# Patient Record
Sex: FEMALE | Employment: OTHER | URBAN - METROPOLITAN AREA
[De-identification: names, ages, dates, MRNs, and addresses within clinical notes are randomized per-mention and may not be internally consistent; named-entity substitution may affect disease eponyms.]

---

## 2020-07-17 ENCOUNTER — HOSPITAL ENCOUNTER (INPATIENT)
Age: 85
LOS: 4 days | Discharge: HOME | DRG: 813 | End: 2020-07-21
Attending: EMERGENCY MEDICINE | Admitting: INTERNAL MEDICINE
Payer: MEDICARE

## 2020-07-17 ENCOUNTER — APPOINTMENT (OUTPATIENT)
Dept: CT IMAGING | Age: 85
DRG: 813 | End: 2020-07-17
Attending: NURSE PRACTITIONER
Payer: MEDICARE

## 2020-07-17 DIAGNOSIS — K92.1 GASTROINTESTINAL HEMORRHAGE WITH MELENA: Primary | ICD-10-CM

## 2020-07-17 DIAGNOSIS — R00.0 TACHYCARDIA: ICD-10-CM

## 2020-07-17 DIAGNOSIS — R77.8 ELEVATED TROPONIN: ICD-10-CM

## 2020-07-17 DIAGNOSIS — K92.2 GASTROINTESTINAL HEMORRHAGE, UNSPECIFIED GASTROINTESTINAL HEMORRHAGE TYPE: ICD-10-CM

## 2020-07-17 DIAGNOSIS — D64.9 ANEMIA, UNSPECIFIED TYPE: ICD-10-CM

## 2020-07-17 PROBLEM — D62 ACUTE BLOOD LOSS ANEMIA: Status: ACTIVE | Noted: 2020-07-17

## 2020-07-17 PROBLEM — D68.9 COAGULOPATHY (HCC): Status: ACTIVE | Noted: 2020-07-17

## 2020-07-17 PROBLEM — K62.5 RECTAL BLEED: Status: ACTIVE | Noted: 2020-07-17

## 2020-07-17 LAB
ALBUMIN SERPL-MCNC: 3.3 G/DL (ref 3.4–5)
ALBUMIN/GLOB SERPL: 0.9 {RATIO} (ref 0.8–1.7)
ALP SERPL-CCNC: 52 U/L (ref 45–117)
ALT SERPL-CCNC: 14 U/L (ref 13–56)
ANION GAP SERPL CALC-SCNC: 5 MMOL/L (ref 3–18)
AST SERPL-CCNC: 14 U/L (ref 10–38)
BASOPHILS # BLD: 0 K/UL (ref 0–0.1)
BASOPHILS NFR BLD: 1 % (ref 0–2)
BILIRUB SERPL-MCNC: 0.3 MG/DL (ref 0.2–1)
BNP SERPL-MCNC: 1905 PG/ML (ref 0–1800)
BUN SERPL-MCNC: 14 MG/DL (ref 7–18)
BUN/CREAT SERPL: 12 (ref 12–20)
CALCIUM SERPL-MCNC: 8.3 MG/DL (ref 8.5–10.1)
CHLORIDE SERPL-SCNC: 113 MMOL/L (ref 100–111)
CK MB CFR SERPL CALC: 1.1 % (ref 0–4)
CK MB SERPL-MCNC: 1.7 NG/ML (ref 5–25)
CK SERPL-CCNC: 152 U/L (ref 26–192)
CO2 SERPL-SCNC: 24 MMOL/L (ref 21–32)
CREAT SERPL-MCNC: 1.16 MG/DL (ref 0.6–1.3)
DIFFERENTIAL METHOD BLD: ABNORMAL
EOSINOPHIL # BLD: 0 K/UL (ref 0–0.4)
EOSINOPHIL NFR BLD: 1 % (ref 0–5)
ERYTHROCYTE [DISTWIDTH] IN BLOOD BY AUTOMATED COUNT: 14.8 % (ref 11.6–14.5)
GLOBULIN SER CALC-MCNC: 3.7 G/DL (ref 2–4)
GLUCOSE SERPL-MCNC: 146 MG/DL (ref 74–99)
HCT VFR BLD AUTO: 20 % (ref 35–45)
HEMOCCULT STL QL: POSITIVE
HGB BLD-MCNC: 6.4 G/DL (ref 12–16)
INR PPP: 2.7 (ref 0.8–1.2)
LACTATE SERPL-SCNC: 1.8 MMOL/L (ref 0.4–2)
LIPASE SERPL-CCNC: 67 U/L (ref 73–393)
LYMPHOCYTES # BLD: 1.9 K/UL (ref 0.9–3.6)
LYMPHOCYTES NFR BLD: 31 % (ref 21–52)
MCH RBC QN AUTO: 28.6 PG (ref 24–34)
MCHC RBC AUTO-ENTMCNC: 32 G/DL (ref 31–37)
MCV RBC AUTO: 89.3 FL (ref 74–97)
MONOCYTES # BLD: 0.3 K/UL (ref 0.05–1.2)
MONOCYTES NFR BLD: 5 % (ref 3–10)
NEUTS SEG # BLD: 3.9 K/UL (ref 1.8–8)
NEUTS SEG NFR BLD: 62 % (ref 40–73)
PLATELET # BLD AUTO: 229 K/UL (ref 135–420)
PMV BLD AUTO: 11.3 FL (ref 9.2–11.8)
POTASSIUM SERPL-SCNC: 3.8 MMOL/L (ref 3.5–5.5)
PROT SERPL-MCNC: 7 G/DL (ref 6.4–8.2)
PROTHROMBIN TIME: 27.6 SEC (ref 11.5–15.2)
RBC # BLD AUTO: 2.24 M/UL (ref 4.2–5.3)
SODIUM SERPL-SCNC: 142 MMOL/L (ref 136–145)
TROPONIN I SERPL-MCNC: 0.08 NG/ML (ref 0–0.04)
WBC # BLD AUTO: 6.2 K/UL (ref 4.6–13.2)

## 2020-07-17 PROCEDURE — 85610 PROTHROMBIN TIME: CPT

## 2020-07-17 PROCEDURE — 82272 OCCULT BLD FECES 1-3 TESTS: CPT

## 2020-07-17 PROCEDURE — 85025 COMPLETE CBC W/AUTO DIFF WBC: CPT

## 2020-07-17 PROCEDURE — 65660000000 HC RM CCU STEPDOWN

## 2020-07-17 PROCEDURE — 70450 CT HEAD/BRAIN W/O DYE: CPT

## 2020-07-17 PROCEDURE — 80053 COMPREHEN METABOLIC PANEL: CPT

## 2020-07-17 PROCEDURE — 99285 EMERGENCY DEPT VISIT HI MDM: CPT

## 2020-07-17 PROCEDURE — 74011250636 HC RX REV CODE- 250/636: Performed by: NURSE PRACTITIONER

## 2020-07-17 PROCEDURE — 82550 ASSAY OF CK (CPK): CPT

## 2020-07-17 PROCEDURE — 74011000250 HC RX REV CODE- 250: Performed by: NURSE PRACTITIONER

## 2020-07-17 PROCEDURE — 77030021352 HC CBL LD SYS DISP COVD -B

## 2020-07-17 PROCEDURE — 83605 ASSAY OF LACTIC ACID: CPT

## 2020-07-17 PROCEDURE — 93005 ELECTROCARDIOGRAM TRACING: CPT

## 2020-07-17 PROCEDURE — 96374 THER/PROPH/DIAG INJ IV PUSH: CPT

## 2020-07-17 PROCEDURE — 36430 TRANSFUSION BLD/BLD COMPNT: CPT

## 2020-07-17 PROCEDURE — 86900 BLOOD TYPING SEROLOGIC ABO: CPT

## 2020-07-17 PROCEDURE — 86923 COMPATIBILITY TEST ELECTRIC: CPT

## 2020-07-17 PROCEDURE — P9016 RBC LEUKOCYTES REDUCED: HCPCS

## 2020-07-17 PROCEDURE — 30233N1 TRANSFUSION OF NONAUTOLOGOUS RED BLOOD CELLS INTO PERIPHERAL VEIN, PERCUTANEOUS APPROACH: ICD-10-PCS | Performed by: NURSE PRACTITIONER

## 2020-07-17 PROCEDURE — C9113 INJ PANTOPRAZOLE SODIUM, VIA: HCPCS | Performed by: NURSE PRACTITIONER

## 2020-07-17 PROCEDURE — 83880 ASSAY OF NATRIURETIC PEPTIDE: CPT

## 2020-07-17 PROCEDURE — 83690 ASSAY OF LIPASE: CPT

## 2020-07-17 RX ORDER — TRAMADOL HYDROCHLORIDE 50 MG/1
50 TABLET ORAL
Status: DISCONTINUED | OUTPATIENT
Start: 2020-07-17 | End: 2020-07-21 | Stop reason: HOSPADM

## 2020-07-17 RX ORDER — SODIUM CHLORIDE 0.9 % (FLUSH) 0.9 %
5-40 SYRINGE (ML) INJECTION AS NEEDED
Status: DISCONTINUED | OUTPATIENT
Start: 2020-07-17 | End: 2020-07-21 | Stop reason: HOSPADM

## 2020-07-17 RX ORDER — PHYTONADIONE 1 MG/.5ML
5 INJECTION, EMULSION INTRAMUSCULAR; INTRAVENOUS; SUBCUTANEOUS ONCE
Status: COMPLETED | OUTPATIENT
Start: 2020-07-18 | End: 2020-07-18

## 2020-07-17 RX ORDER — SODIUM CHLORIDE 9 MG/ML
250 INJECTION, SOLUTION INTRAVENOUS AS NEEDED
Status: DISCONTINUED | OUTPATIENT
Start: 2020-07-17 | End: 2020-07-21 | Stop reason: HOSPADM

## 2020-07-17 RX ORDER — METOPROLOL TARTRATE 25 MG/1
25 TABLET, FILM COATED ORAL EVERY 12 HOURS
Status: DISCONTINUED | OUTPATIENT
Start: 2020-07-18 | End: 2020-07-21 | Stop reason: HOSPADM

## 2020-07-17 RX ORDER — SODIUM CHLORIDE 0.9 % (FLUSH) 0.9 %
5-40 SYRINGE (ML) INJECTION EVERY 8 HOURS
Status: DISCONTINUED | OUTPATIENT
Start: 2020-07-18 | End: 2020-07-21 | Stop reason: HOSPADM

## 2020-07-17 RX ORDER — DEXTROSE, SODIUM CHLORIDE, AND POTASSIUM CHLORIDE 5; .45; .075 G/100ML; G/100ML; G/100ML
50 INJECTION INTRAVENOUS CONTINUOUS
Status: DISCONTINUED | OUTPATIENT
Start: 2020-07-18 | End: 2020-07-21 | Stop reason: HOSPADM

## 2020-07-17 RX ADMIN — SODIUM CHLORIDE 500 ML: 900 INJECTION, SOLUTION INTRAVENOUS at 16:20

## 2020-07-17 RX ADMIN — SODIUM CHLORIDE 80 MG: 9 INJECTION, SOLUTION INTRAMUSCULAR; INTRAVENOUS; SUBCUTANEOUS at 16:20

## 2020-07-17 NOTE — H&P
Internal Medicine History and Physical  Note           NAME:  Nick Nelson   :   1935   MRN:  [id number]     PCP:  Mehrdad Iraheta MD     Date/Time:  2020 5:50 PM      I hereby certify this patient for admission based upon medical necessity as noted below:        Assessment / plan :        #   GI bleed (2020). Presented as rectal bleeding. PPI IV . GI consulted. IVF. Serial H/H. Monitor lytes. Maintain good IV access. Maintain vital signs. Avoid NSAID.   1 unit of blood transfusion ordered by ER staff    #Coagulopathy. INR elevated. She is on Xarelto. No need for active reversal with plasma agents as no active bleeding. Subcutaneous vitamin K and observe. Monitor INR. #   HTN (hypertension) (2015) control blood pressure. Placed parameter to avoid hypotension especially with GI bleed     #History of chronic diastolic CHF (congestive heart failure) and coagulopathy ([address] (2020). Home regimen when appropriate. She does not follow with cardiologist.     #  Acute blood loss anemia (2020) blood transfusion according to guideline    #Does not have medical follow-up and noncompliance with medication. She report last time seen PCP about 3 years ago. She take her medications infrequently and that is why it last long that time. Luiz Rivera -DVT prophylaxis :  SCD.   - Code Status : full     -Other chronic medical problems as per past history. Further management depend on the course of the case and expanded data base. DISPO - pt to be admitted at this time for reasons addressed above, continued hospitalization for ongoing assessment and treatment indicated        Subjective:     CHIEF COMPLAINT: Passing of blood in the bathroom    HISTORY OF PRESENT ILLNESS:     Ms. Vaibhav Orellana is a 80 y.o.  female who is admitted for rectal bleeding and coagulopathy.   Ms. Vaibhav Orellana with past medical history as noted below , presented to the Emergency Department today  complaining of above. Triage and ER notes noted. Patient presented to the emergency room complaining of dizziness and blood in the stool for the last 3 days. She told me she has been losing blood when she goes to the bathroom since Sunday and she thought this can get better but it did not light up and as she did not feel well today she called her granddaughter who brought her to the emergency room. She denies nausea vomiting or abdominal pain. She did not eat anything today because she did not feel like it and also to avoid to go to the bathroom which goes right away or soon after she ate. She cannot remember when was the last time seen primary doctor but as of March when she was admitted to this hospital about 3 years ago and never seen a doctor since, maybe 1 or 2 times. No doctor calls medications for her but she does not take any medicine regularly and that is white/long. Patient was poor historian clearly and able to give meaningful or appropriate history apart from what mentioned. ER provider called GI doctor on call and ordered one blood transfusion for her. Her hemoglobin was 6.4 in the emergency room. She does not check her blood pressure. She is not aware of any thyroid problem or diabetes mellitus. Fecal occult blood tested positive in the ER. She does not smoke or drink alcohol or any other substances. She does not take aspirin or over-the-counter ibuprofen or similar agents. She Drive and lives by herself    Past Medical History:   Diagnosis Date    Atrial flutter ([facility] [address] 02/2015    Cardiomyopathy ([facility] [address]     EF 30% (2/15)    Hypertension     Mitral regurgitation         History reviewed. No pertinent surgical history. Social History     Tobacco Use    Smoking status: Never Smoker    Smokeless tobacco: Never Used   Substance Use Topics    Alcohol use: No        History reviewed. No pertinent family history.      No Known Allergies     Prior to Admission medications Medication Sig Start Date End Date Taking? Authorizing Provider   rivaroxaban (XARELTO) 20 mg tab tablet Take 1 Tab by mouth daily (with breakfast). 2/25/15   Megan Munoz MD   metoprolol (LOPRESSOR) 25 mg tablet Take 1 Tab by mouth every twelve (12) hours. 2/24/15   Megan Munoz MD   lisinopril (PRINIVIL, ZESTRIL) 5 mg tablet Take 1 Tab by mouth daily. 2/24/15   Megan Munoz MD   furosemide (LASIX) 40 mg tablet Take 1 Tab by mouth daily. 2/24/15   Megan Munoz MD   potassium chloride (KAON 10%) 20 mEq/15 mL solution Take 15 mL by mouth daily. 2/24/15   Megan Munoz MD   traMADol (ULTRAM) 50 mg tablet Take 1 Tab by mouth every six (6) hours as needed for Pain.  11/7/13   MAULIK Bianchi       Review of Systems:  (bold if positive, otherwise negative), apart from what mentioned in HPI  Apart from above patient deny any other significant complain  Gen:  Weight gain, weight loss, fever, chills, fatigue  Eyes:  Visual changes, pain, conjunctivitis  ENT:  Sore throat, rhinorrhea, decreased hearing  CVS:  Palpitations, chest pain, dizziness, syncope, edema, PND  Pulm:  Cough, dyspnea, sputum, hemoptysis, wheezing  GI:  Abdominal pain, nausea, emesis, diarrhea, constipation, GERD, melena  :  Hematuria, incontinence, nocturia, dysuria, discharge  MS:  Pain, weakness, swelling, arthritis  Skin:  Rash, erythema, abscess, wound, moles  Endo:  Heat intolerance, cold intolerance, weight gain, polyuria  Hem:  Enlarged nodes, bruising, bleeding, night sweats  Renal:  Edema, change in urine, flank pain  Neuro:  Numbness, tingling, weakness, seizure, headache, tremors       VITALS:    Vital signs reviewed; most recent are:    Visit Vitals  /75 (BP 1 Location: Right arm, BP Patient Position: At rest)   Pulse (!) 115   Temp 98 °F (36.7 °C)   Resp 16   Ht 5' 4\" (1.626 m)   Wt 68 kg (150 lb)   SpO2 100%   BMI 25.75 kg/m²     SpO2 Readings from Last 6 Encounters:   07/17/20 100%   06/18/15 98% 03/17/15 99%   02/24/15 96%   11/07/13 100%            Intake/Output Summary (Last 24 hours) at 7/17/2020 1821  Last data filed at 7/17/2020 1733  Gross per 24 hour   Intake 500 ml   Output    Net 500 ml        Physical Exam:     Gen:  Appear stated age, Well-developed, well-nourished, in no acute distress  HEENT: Pale conjunctiva head atraumatic, normocephalic , hearing intact to voice, moist mucous membranes. Neck:  Trachea midline , No apparent JVD, Supple, without masses, thyroid non-tender  Resp:  No accessory muscle use,Bilateral BS present, clear breath sounds without wheezes rales or rhonchi  Card:   normal S1, S2 without Gallop . No Significant lower leg peripheral edema. Abd:  Soft, non-tender, non-distended, bowel sounds are present. Musc:  No cyanosis or clubbing. Skin:  No rashes or ulcers, skin turgor is good. Neuro:  Cranial nerves are grossly intact, no clear area of focal motor weakness, follows commands appropriately. Psych:   oriented to person, place and time, alert. Labs: All Cardiac Markers in the last 24 hours:   Lab Results   Component Value Date/Time     07/17/2020 04:10 PM    CKMB 1.7 07/17/2020 04:10 PM    CKND1 1.1 07/17/2020 04:10 PM    TROIQ 0.08 (H) 07/17/2020 04:10 PM       Recent Labs     07/17/20  1610   WBC 6.2   HGB 6.4*   HCT 20.0*        Recent Labs     07/17/20  1610      K 3.8   *   CO2 24   *   BUN 14   CREA 1.16   CA 8.3*   ALB 3.3*   TBILI 0.3   ALT 14     No results found for: GLUCPOC  No results for input(s): PH, PCO2, PO2, HCO3, FIO2 in the last 72 hours. Recent Labs     07/17/20  1610   INR 2.7*       Ct Head Wo Cont    Result Date: 7/17/2020  IMPRESSION: 1. No CT evidence of an acute intracranial abnormality - in particular, no acute cortical infarct, hemorrhage, or mass effect.   2.  Relatively mild cerebral atrophy/volume loss and periventricular white matter changes, most commonly seen with chronic microvascular disease. Please refer to radiology reports. Risk of deterioration: high      Total time spent in the care of this patient: [address] discussed with: Patient, Nursing Staff, >50% of time spent in counseling and coordination of care and ER provider      Discussed:  Care Plan       I have personally reviewed all pertinent labs, films and EKGs that have officially resulted. I reviewed available pertaining electronic documentation outlining the initial presentation as well as the emergency room physician's encounter. This document in whole or part of it has been produced using voice recognition software. Unrecognized errors in transcription may be present.     Attending Physician: Michelle Birmingham MD

## 2020-07-17 NOTE — ED PROVIDER NOTES
EMERGENCY DEPARTMENT HISTORY AND PHYSICAL EXAM    5:39 PM      Date: 7/17/2020  Patient Name: Nick Nelson    History of Presenting Illness     Chief Complaint   Patient presents with    Dizziness    Blood in Urine    Anorexia         History Provided By: Patient    Additional History (Context): Nick Nelson is a 80 y.o. female with hx of atrial flutter, cardiomayopathy, HTN who presents with complain of dizziness and blood in her stool for the past 3 days. Pt reports she takes xarelto daily. Pt is not sure if her last dose was today or yesterday. Patient denies abdominal pain, nausea, vomiting. Pt denies any fevers, headaches, extremity weakness, or numbness. Pt denies visual loss. PCP: Mehrdad Iraheta MD    Current Facility-Administered Medications   Medication Dose Route Frequency Provider Last Rate Last Dose    0.9% sodium chloride infusion 250 mL  250 mL IntraVENous PRN Elaine Javier NP         Current Outpatient Medications   Medication Sig Dispense Refill    rivaroxaban (XARELTO) 20 mg tab tablet Take 1 Tab by mouth daily (with breakfast). 30 Tab 0    metoprolol (LOPRESSOR) 25 mg tablet Take 1 Tab by mouth every twelve (12) hours. 60 Tab 0    lisinopril (PRINIVIL, ZESTRIL) 5 mg tablet Take 1 Tab by mouth daily. 30 Tab 0    furosemide (LASIX) 40 mg tablet Take 1 Tab by mouth daily. 30 Tab 0    potassium chloride (KAON 10%) 20 mEq/15 mL solution Take 15 mL by mouth daily. 480 mL 0    traMADol (ULTRAM) 50 mg tablet Take 1 Tab by mouth every six (6) hours as needed for Pain. 20 Tab 0       Past History     Past Medical History:  Past Medical History:   Diagnosis Date    Atrial flutter ([facility] [address] 02/2015    Cardiomyopathy ([facility] [address]     EF 30% (2/15)    Hypertension     Mitral regurgitation        Past Surgical History:  History reviewed. No pertinent surgical history. Family History:  History reviewed. No pertinent family history.     Social History:  Social History     Tobacco Use    Smoking status: Never Smoker    Smokeless tobacco: Never Used   Substance Use Topics    Alcohol use: No    Drug use: No       Allergies:  No Known Allergies      Review of Systems       Review of Systems   Constitutional: Negative for chills and fever. Respiratory: Negative for shortness of breath. Cardiovascular: Negative for chest pain. Gastrointestinal: Negative for abdominal pain, nausea and vomiting. Skin: Negative for rash. Neurological: Negative for weakness. All other systems reviewed and are negative. Physical Exam     Visit Vitals  /58   Pulse 84   Temp 98 °F (36.7 °C)   Resp 20   Ht 5' 4\" (1.626 m)   Wt 68 kg (150 lb)   SpO2 100%   BMI 25.75 kg/m²         Physical Exam  Vitals signs reviewed. Constitutional:       General: She is not in acute distress. Appearance: Normal appearance. She is well-developed. She is not ill-appearing or toxic-appearing. HENT:      Head: Normocephalic and atraumatic. Eyes:      Conjunctiva/sclera: Conjunctivae normal.      Pupils: Pupils are equal, round, and reactive to light. Neck:      Musculoskeletal: Normal range of motion. Trachea: Trachea normal.   Cardiovascular:      Rate and Rhythm: Normal rate and regular rhythm. Pulmonary:      Effort: Pulmonary effort is normal.      Breath sounds: Normal breath sounds. Abdominal:      General: Bowel sounds are normal. There is no distension or abdominal bruit. Palpations: Abdomen is soft. Abdomen is not rigid. There is no shifting dullness, fluid wave, mass or pulsatile mass. Tenderness: There is no abdominal tenderness. There is no guarding. Negative signs include Quintero's sign and McBurney's sign. Comments: No abdominal tenderness. Genitourinary:     Rectum: Guaiac result positive. No tenderness or anal fissure. Normal anal tone. Musculoskeletal:      Right lower leg: No edema. Left lower leg: No edema.    Neurological:      General: No focal deficit present. Mental Status: She is alert and oriented to person, place, and time. Mental status is at baseline. Diagnostic Study Results     Labs -  Recent Results (from the past 12 hour(s))   EKG, 12 LEAD, INITIAL    Collection Time: 07/17/20  3:47 PM   Result Value Ref Range    Ventricular Rate 113 BPM    Atrial Rate 113 BPM    P-R Interval 176 ms    QRS Duration 138 ms    Q-T Interval 432 ms    QTC Calculation (Bezet) 592 ms    Calculated R Axis -34 degrees    Calculated T Axis 16 degrees    Diagnosis       Sinus tachycardia with occasional premature ventricular complexes  Left axis deviation  Right bundle branch block  Minimal voltage criteria for LVH, may be normal variant ( R in aVL )  Abnormal ECG  When compared with ECG of 19-FEB-2015 16:01,  Significant changes have occurred     CBC WITH AUTOMATED DIFF    Collection Time: 07/17/20  4:10 PM   Result Value Ref Range    WBC 6.2 4.6 - 13.2 K/uL    RBC 2.24 (L) 4.20 - 5.30 M/uL    HGB 6.4 (L) 12.0 - 16.0 g/dL    HCT 20.0 (L) 35.0 - 45.0 %    MCV 89.3 74.0 - 97.0 FL    MCH 28.6 24.0 - 34.0 PG    MCHC 32.0 31.0 - 37.0 g/dL    RDW 14.8 (H) 11.6 - 14.5 %    PLATELET 363 936 - 719 K/uL    MPV 11.3 9.2 - 11.8 FL    NEUTROPHILS 62 40 - 73 %    LYMPHOCYTES 31 21 - 52 %    MONOCYTES 5 3 - 10 %    EOSINOPHILS 1 0 - 5 %    BASOPHILS 1 0 - 2 %    ABS. NEUTROPHILS 3.9 1.8 - 8.0 K/UL    ABS. LYMPHOCYTES 1.9 0.9 - 3.6 K/UL    ABS. MONOCYTES 0.3 0.05 - 1.2 K/UL    ABS. EOSINOPHILS 0.0 0.0 - 0.4 K/UL    ABS.  BASOPHILS 0.0 0.0 - 0.1 K/UL    DF AUTOMATED     METABOLIC PANEL, COMPREHENSIVE    Collection Time: 07/17/20  4:10 PM   Result Value Ref Range    Sodium 142 136 - 145 mmol/L    Potassium 3.8 3.5 - 5.5 mmol/L    Chloride 113 (H) 100 - 111 mmol/L    CO2 24 21 - 32 mmol/L    Anion gap 5 3.0 - 18 mmol/L    Glucose 146 (H) 74 - 99 mg/dL    BUN 14 7.0 - 18 MG/DL    Creatinine 1.16 0.6 - 1.3 MG/DL    BUN/Creatinine ratio 12 12 - 20      GFR est AA 54 (L) >60 ml/min/1.73m2    GFR est non-AA 45 (L) >60 ml/min/1.73m2    Calcium 8.3 (L) 8.5 - 10.1 MG/DL    Bilirubin, total 0.3 0.2 - 1.0 MG/DL    ALT (SGPT) 14 13 - 56 U/L    AST (SGOT) 14 10 - 38 U/L    Alk. phosphatase 52 45 - 117 U/L    Protein, total 7.0 6.4 - 8.2 g/dL    Albumin 3.3 (L) 3.4 - 5.0 g/dL    Globulin 3.7 2.0 - 4.0 g/dL    A-G Ratio 0.9 0.8 - 1.7     LIPASE    Collection Time: 07/17/20  4:10 PM   Result Value Ref Range    Lipase 67 (L) 73 - 393 U/L   LACTIC ACID    Collection Time: 07/17/20  4:10 PM   Result Value Ref Range    Lactic acid 1.8 0.4 - 2.0 MMOL/L   OCCULT BLOOD, STOOL    Collection Time: 07/17/20  4:10 PM   Result Value Ref Range    Occult blood, stool Positive (A) NEG     PROTHROMBIN TIME + INR    Collection Time: 07/17/20  4:10 PM   Result Value Ref Range    Prothrombin time 27.6 (H) 11.5 - 15.2 sec    INR 2.7 (H) 0.8 - 1.2     TYPE & SCREEN    Collection Time: 07/17/20  4:10 PM   Result Value Ref Range    Crossmatch Expiration 07/20/2020     ABO/Rh(D) O POSITIVE     Antibody screen NEG     Unit number [id number]     Blood component type RC LR     Unit division 00     Status of unit ALLOCATED     Crossmatch result Compatible    CARDIAC PANEL,(CK, CKMB & TROPONIN)    Collection Time: 07/17/20  4:10 PM   Result Value Ref Range    CK - MB 1.7 <3.6 ng/ml    CK-MB Index 1.1 0.0 - 4.0 %     26 - 192 U/L    Troponin-I, QT 0.08 (H) 0.0 - 0.045 NG/ML   NT-PRO BNP    Collection Time: 07/17/20  4:10 PM   Result Value Ref Range    NT pro-BNP 1,905 (H) 0 - 1,800 PG/ML       Radiologic Studies -   CT HEAD WO CONT   Final Result   IMPRESSION:      1. No CT evidence of an acute intracranial abnormality - in particular, no   acute cortical infarct, hemorrhage, or mass effect. 2.  Relatively mild cerebral atrophy/volume loss and periventricular white   matter changes, most commonly seen with chronic microvascular disease.                    Medical Decision Making   I am the first provider for this patient. I reviewed the vital signs, available nursing notes, past medical history, past surgical history, family history and social history. Vital Signs-Reviewed the patient's vital signs. Records Reviewed: Nursing Notes and Old Medical Records (Time of Review: 5:39 PM)    ED Course: Progress Notes, Reevaluation, and Consults:    Provider Notes (Medical Decision Making):    80 y.o. female with hx of atrial flutter, cardiomayopathy, HTN who presents with complain of dizziness and blood in her stool for the past 3 days. Pt reports she takes xarelto daily. Pt is not sure if her last dose was today or yesterday. Patient denies abdominal pain, nausea, vomiting. Pt denies any fevers, headaches, extremity weakness, or numbness. Pt denies visual loss. Pt hemoglobin is 6.4. Hemoccult was positive. Bright red blood on stool sample. No active hemorrhage from rectum. I suspect patient may have GI bleed. Protonix given. Blood transfusion ordered. Ct of head showed no acute hemmorrhage. Troponin 0.08. consulted with GI, Dr. Janes Mina and will see patient tomorrow and to stop Xarelto. Spoke to hospitalist Dr. Rc Collazo and agreed to admit patient. He was made aware of GI consult. Cardiologist Dr. Sherice Gerber made aware of patient slightly elevated troponin and added to the treatment team. No further orders at this time. Pt is in no acute distress and hemodynamically stable at this time. Vital signs are normal. BP stable. HR has decreased. Pt denies any abdominal pain or any other complaints at this time. All ADT Orders (ADT stands for Admission Discharge Transfer) (From admission, onward)     Start     Ordered    07/17/20 1800  INITIAL PHYSICIAN ORDER: INPATIENT Telemetry; 9.  Other (further clarification in H&P documentation)  ONE TIME     Authorizing Provider:  William Hanna MD    User who entered the order:  William Hanna MD       Question Answer Comment   Status: INPATIENT    Type of Bed Telemetry Inpatient Hospitalization Certified Necessary for the Following Reasons 9. Other (further clarification in H&P documentation)    Admitting Diagnosis GI bleed    Admitting Physician Kurtis Patel    Attending Physician Kurtis Patel    Estimated Length of Stay 2 Midnights    Discharge Plan: Home with Office Follow-up        07/17/20 1801                Diagnosis     Clinical Impression:   1. Gastrointestinal hemorrhage with melena    2. Anemia, unspecified type    3. Tachycardia    4. Gastrointestinal hemorrhage, unspecified gastrointestinal hemorrhage type    5. Elevated troponin        Disposition: Admit    Follow-up Information    None          Patient's Medications   Start Taking    No medications on file   Continue Taking    FUROSEMIDE (LASIX) 40 MG TABLET    Take 1 Tab by mouth daily. LISINOPRIL (PRINIVIL, ZESTRIL) 5 MG TABLET    Take 1 Tab by mouth daily. METOPROLOL (LOPRESSOR) 25 MG TABLET    Take 1 Tab by mouth every twelve (12) hours. POTASSIUM CHLORIDE (KAON 10%) 20 MEQ/15 ML SOLUTION    Take 15 mL by mouth daily. RIVAROXABAN (XARELTO) 20 MG TAB TABLET    Take 1 Tab by mouth daily (with breakfast). TRAMADOL (ULTRAM) 50 MG TABLET    Take 1 Tab by mouth every six (6) hours as needed for Pain. These Medications have changed    No medications on file   Stop Taking    No medications on file       Dictation disclaimer:  Please note that this dictation was completed with [employer], the computer voice recognition software. Quite often unanticipated grammatical, syntax, homophones, and other interpretive errors are inadvertently transcribed by the computer software. Please disregard these errors. Please excuse any errors that have escaped final proofreading.

## 2020-07-18 ENCOUNTER — APPOINTMENT (OUTPATIENT)
Dept: NON INVASIVE DIAGNOSTICS | Age: 85
DRG: 813 | End: 2020-07-18
Attending: INTERNAL MEDICINE
Payer: MEDICARE

## 2020-07-18 LAB
ALBUMIN SERPL-MCNC: 2.8 G/DL (ref 3.4–5)
ALBUMIN/GLOB SERPL: 1.1 {RATIO} (ref 0.8–1.7)
ALP SERPL-CCNC: 42 U/L (ref 45–117)
ALT SERPL-CCNC: 10 U/L (ref 13–56)
ANION GAP SERPL CALC-SCNC: 3 MMOL/L (ref 3–18)
AST SERPL-CCNC: 16 U/L (ref 10–38)
ATRIAL RATE: 113 BPM
BASOPHILS # BLD: 0 K/UL (ref 0–0.1)
BASOPHILS NFR BLD: 1 % (ref 0–2)
BILIRUB DIRECT SERPL-MCNC: 0.1 MG/DL (ref 0–0.2)
BILIRUB SERPL-MCNC: 0.4 MG/DL (ref 0.2–1)
BUN SERPL-MCNC: 13 MG/DL (ref 7–18)
BUN/CREAT SERPL: 14 (ref 12–20)
CALCIUM SERPL-MCNC: 7.8 MG/DL (ref 8.5–10.1)
CALCULATED R AXIS, ECG10: -34 DEGREES
CALCULATED T AXIS, ECG11: 16 DEGREES
CHLORIDE SERPL-SCNC: 115 MMOL/L (ref 100–111)
CO2 SERPL-SCNC: 25 MMOL/L (ref 21–32)
CREAT SERPL-MCNC: 0.96 MG/DL (ref 0.6–1.3)
DIAGNOSIS, 93000: NORMAL
DIFFERENTIAL METHOD BLD: ABNORMAL
ECHO AO ASC DIAM: 3.12 CM
ECHO AO ROOT DIAM: 3.16 CM
ECHO IVC PROX: 2.19 CM
ECHO IVC SNIFF: 2.19 CM
ECHO LA AREA 2C: 29.05 CM2
ECHO LA AREA 4C: 29.3 CM2
ECHO LA MAJOR AXIS: 3.48 CM
ECHO LA MINOR AXIS: 1.91 CM
ECHO LA TO AORTIC ROOT RATIO: 1.1
ECHO LA VOL 2C: 105.53 ML (ref 22–52)
ECHO LA VOL 4C: 98.37 ML (ref 22–52)
ECHO LA VOL BP: 111.3 ML (ref 22–52)
ECHO LA VOL/BSA BIPLANE: 61.12 ML/M2 (ref 16–28)
ECHO LA VOLUME INDEX A2C: 57.95 ML/M2 (ref 16–28)
ECHO LA VOLUME INDEX A4C: 54.02 ML/M2 (ref 16–28)
ECHO LV E' SEPTAL VELOCITY: 3.55 CM/S
ECHO LV EDV A2C: 149 ML
ECHO LV EDV A4C: 162.9 ML
ECHO LV EDV BP: 160.3 ML (ref 56–104)
ECHO LV EDV INDEX A4C: 89.5 ML/M2
ECHO LV EDV INDEX BP: 88 ML/M2
ECHO LV EDV NDEX A2C: 81.8 ML/M2
ECHO LV EDV TEICHHOLZ: 0.93 ML
ECHO LV EJECTION FRACTION A2C: 55 %
ECHO LV EJECTION FRACTION A4C: 40 %
ECHO LV EJECTION FRACTION BIPLANE: 48 % (ref 55–100)
ECHO LV ESV A2C: 67.8 ML
ECHO LV ESV A4C: 97.4 ML
ECHO LV ESV BP: 83.4 ML (ref 19–49)
ECHO LV ESV INDEX A2C: 37.2 ML/M2
ECHO LV ESV INDEX A4C: 53.5 ML/M2
ECHO LV ESV INDEX BP: 45.8 ML/M2
ECHO LV ESV TEICHHOLZ: 0.52 ML
ECHO LV INTERNAL DIMENSION DIASTOLIC: 5.56 CM (ref 3.9–5.3)
ECHO LV INTERNAL DIMENSION SYSTOLIC: 4.33 CM
ECHO LV IVSD: 0.94 CM (ref 0.6–0.9)
ECHO LV MASS 2D: 178.6 G (ref 67–162)
ECHO LV MASS INDEX 2D: 98.1 G/M2 (ref 43–95)
ECHO LV POSTERIOR WALL DIASTOLIC: 0.78 CM (ref 0.6–0.9)
ECHO LVOT DIAM: 1.99 CM
ECHO LVOT PEAK GRADIENT: 4.1 MMHG
ECHO LVOT SV: 58.2 ML
ECHO LVOT VTI: 18.64 CM
ECHO MV A VELOCITY: 112.62 CM/S
ECHO MV E DECELERATION TIME (DT): 185.6 MS
ECHO MV E VELOCITY: 72.32 CM/S
ECHO MV E/A RATIO: 0.64
ECHO MV E/E' SEPTAL: 20.37
ECHO RA MINOR AXIS: 4.54 CM
ECHO RV TAPSE: 2.17 CM (ref 1.5–2)
ECHO TV REGURGITANT MAX VELOCITY: 303 CM/S
ECHO TV REGURGITANT PEAK GRADIENT: 36.8 MMHG
EOSINOPHIL # BLD: 0.1 K/UL (ref 0–0.4)
EOSINOPHIL NFR BLD: 2 % (ref 0–5)
ERYTHROCYTE [DISTWIDTH] IN BLOOD BY AUTOMATED COUNT: 15.2 % (ref 11.6–14.5)
GLOBULIN SER CALC-MCNC: 2.5 G/DL (ref 2–4)
GLUCOSE SERPL-MCNC: 110 MG/DL (ref 74–99)
HCT VFR BLD AUTO: 18.2 % (ref 35–45)
HCT VFR BLD AUTO: 21.9 % (ref 35–45)
HGB BLD-MCNC: 5.8 G/DL (ref 12–16)
HGB BLD-MCNC: 7 G/DL (ref 12–16)
INR PPP: 1.9 (ref 0.8–1.2)
LVFS 2D: 22.08 %
LVOT MG: 2.31 MMHG
LVOT MV: 0.71 CM/S
LVSV (MOD BI): 41.36 ML
LVSV (MOD SINGLE 4C): 35.2 ML
LVSV (MOD SINGLE): 43.63 ML
LVSV (TEICH): 35.82 ML
LYMPHOCYTES # BLD: 2.1 K/UL (ref 0.9–3.6)
LYMPHOCYTES NFR BLD: 33 % (ref 21–52)
MAGNESIUM SERPL-MCNC: 2.3 MG/DL (ref 1.6–2.6)
MCH RBC QN AUTO: 28.6 PG (ref 24–34)
MCHC RBC AUTO-ENTMCNC: 31.9 G/DL (ref 31–37)
MCV RBC AUTO: 89.7 FL (ref 74–97)
MONOCYTES # BLD: 0.4 K/UL (ref 0.05–1.2)
MONOCYTES NFR BLD: 6 % (ref 3–10)
MV DEC SLOPE: 3.9
NEUTS SEG # BLD: 4 K/UL (ref 1.8–8)
NEUTS SEG NFR BLD: 58 % (ref 40–73)
P-R INTERVAL, ECG05: 176 MS
PHOSPHATE SERPL-MCNC: 3 MG/DL (ref 2.5–4.9)
PLATELET # BLD AUTO: 166 K/UL (ref 135–420)
PMV BLD AUTO: 11.6 FL (ref 9.2–11.8)
POTASSIUM SERPL-SCNC: 4.3 MMOL/L (ref 3.5–5.5)
PROT SERPL-MCNC: 5.3 G/DL (ref 6.4–8.2)
PROTHROMBIN TIME: 21 SEC (ref 11.5–15.2)
Q-T INTERVAL, ECG07: 432 MS
QRS DURATION, ECG06: 138 MS
QTC CALCULATION (BEZET), ECG08: 592 MS
RBC # BLD AUTO: 2.03 M/UL (ref 4.2–5.3)
SODIUM SERPL-SCNC: 143 MMOL/L (ref 136–145)
TROPONIN I SERPL-MCNC: 0.09 NG/ML (ref 0–0.04)
VENTRICULAR RATE, ECG03: 113 BPM
WBC # BLD AUTO: 6.6 K/UL (ref 4.6–13.2)

## 2020-07-18 PROCEDURE — 84484 ASSAY OF TROPONIN QUANT: CPT

## 2020-07-18 PROCEDURE — 80076 HEPATIC FUNCTION PANEL: CPT

## 2020-07-18 PROCEDURE — 85610 PROTHROMBIN TIME: CPT

## 2020-07-18 PROCEDURE — 84100 ASSAY OF PHOSPHORUS: CPT

## 2020-07-18 PROCEDURE — 36430 TRANSFUSION BLD/BLD COMPNT: CPT

## 2020-07-18 PROCEDURE — 65660000000 HC RM CCU STEPDOWN

## 2020-07-18 PROCEDURE — 36415 COLL VENOUS BLD VENIPUNCTURE: CPT

## 2020-07-18 PROCEDURE — 85025 COMPLETE CBC W/AUTO DIFF WBC: CPT

## 2020-07-18 PROCEDURE — 85018 HEMOGLOBIN: CPT

## 2020-07-18 PROCEDURE — P9016 RBC LEUKOCYTES REDUCED: HCPCS

## 2020-07-18 PROCEDURE — 74011250636 HC RX REV CODE- 250/636: Performed by: INTERNAL MEDICINE

## 2020-07-18 PROCEDURE — 93306 TTE W/DOPPLER COMPLETE: CPT

## 2020-07-18 PROCEDURE — 77030040361 HC SLV COMPR DVT MDII -B

## 2020-07-18 PROCEDURE — 80048 BASIC METABOLIC PNL TOTAL CA: CPT

## 2020-07-18 PROCEDURE — 74011250637 HC RX REV CODE- 250/637: Performed by: INTERNAL MEDICINE

## 2020-07-18 PROCEDURE — C9113 INJ PANTOPRAZOLE SODIUM, VIA: HCPCS | Performed by: INTERNAL MEDICINE

## 2020-07-18 PROCEDURE — 74011000250 HC RX REV CODE- 250: Performed by: INTERNAL MEDICINE

## 2020-07-18 PROCEDURE — 83735 ASSAY OF MAGNESIUM: CPT

## 2020-07-18 RX ORDER — SODIUM CHLORIDE 9 MG/ML
250 INJECTION, SOLUTION INTRAVENOUS AS NEEDED
Status: DISCONTINUED | OUTPATIENT
Start: 2020-07-18 | End: 2020-07-21 | Stop reason: HOSPADM

## 2020-07-18 RX ORDER — NALOXONE HYDROCHLORIDE 0.4 MG/ML
0.4 INJECTION, SOLUTION INTRAMUSCULAR; INTRAVENOUS; SUBCUTANEOUS AS NEEDED
Status: DISCONTINUED | OUTPATIENT
Start: 2020-07-18 | End: 2020-07-21 | Stop reason: HOSPADM

## 2020-07-18 RX ORDER — FUROSEMIDE 10 MG/ML
20 INJECTION INTRAMUSCULAR; INTRAVENOUS ONCE
Status: COMPLETED | OUTPATIENT
Start: 2020-07-18 | End: 2020-07-18

## 2020-07-18 RX ORDER — PHYTONADIONE 10 MG/ML
INJECTION, EMULSION INTRAMUSCULAR; INTRAVENOUS; SUBCUTANEOUS
Status: DISPENSED
Start: 2020-07-18 | End: 2020-07-18

## 2020-07-18 RX ADMIN — SODIUM CHLORIDE 40 MG: 9 INJECTION, SOLUTION INTRAMUSCULAR; INTRAVENOUS; SUBCUTANEOUS at 01:20

## 2020-07-18 RX ADMIN — METOPROLOL TARTRATE 25 MG: 25 TABLET, FILM COATED ORAL at 22:11

## 2020-07-18 RX ADMIN — PHYTONADIONE 5 MG: 1 INJECTION, EMULSION INTRAMUSCULAR; INTRAVENOUS; SUBCUTANEOUS at 04:30

## 2020-07-18 RX ADMIN — SODIUM CHLORIDE 40 MG: 9 INJECTION, SOLUTION INTRAMUSCULAR; INTRAVENOUS; SUBCUTANEOUS at 22:22

## 2020-07-18 RX ADMIN — Medication 10 ML: at 22:22

## 2020-07-18 RX ADMIN — Medication 10 ML: at 05:08

## 2020-07-18 RX ADMIN — Medication 10 ML: at 14:00

## 2020-07-18 RX ADMIN — DEXTROSE MONOHYDRATE, SODIUM CHLORIDE, AND POTASSIUM CHLORIDE 50 ML/HR: 50; 4.5; .745 INJECTION, SOLUTION INTRAVENOUS at 01:20

## 2020-07-18 RX ADMIN — FUROSEMIDE 20 MG: 10 INJECTION, SOLUTION INTRAMUSCULAR; INTRAVENOUS at 10:58

## 2020-07-18 RX ADMIN — METOPROLOL TARTRATE 25 MG: 25 TABLET, FILM COATED ORAL at 09:05

## 2020-07-18 RX ADMIN — SODIUM CHLORIDE 40 MG: 9 INJECTION, SOLUTION INTRAMUSCULAR; INTRAVENOUS; SUBCUTANEOUS at 09:05

## 2020-07-18 RX ADMIN — Medication 10 ML: at 00:00

## 2020-07-18 NOTE — PROGRESS NOTES
Problem: Falls - Risk of  Goal: *Absence of Falls  Description: Document Clarita Luciano Fall Risk and appropriate interventions in the flowsheet.   Outcome: Progressing Towards Goal  Note: Fall Risk Interventions:            Medication Interventions: Bed/chair exit alarm, Evaluate medications/consider consulting pharmacy    Elimination Interventions: Call light in reach, Toileting schedule/hourly rounds              Problem: Patient Education: Go to Patient Education Activity  Goal: Patient/Family Education  Outcome: Progressing Towards Goal     Problem: Upper and Lower GI Bleed: Day 1  Goal: Psychosocial  Outcome: Progressing Towards Goal  Goal: *Optimal pain control at patient's stated goal  Outcome: Progressing Towards Goal  Goal: *Hemodynamically stable  Outcome: Progressing Towards Goal  Goal: *Demonstrates progressive activity  Outcome: Progressing Towards Goal     Problem: Upper and Lower GI Bleed: Day 2  Goal: Off Pathway (Use only if patient is Off Pathway)  Outcome: Progressing Towards Goal  Goal: Activity/Safety  Outcome: Progressing Towards Goal  Goal: Consults, if ordered  Outcome: Progressing Towards Goal  Goal: Diagnostic Test/Procedures  Outcome: Progressing Towards Goal  Goal: Nutrition/Diet  Outcome: Progressing Towards Goal  Goal: Discharge Planning  Outcome: Progressing Towards Goal  Goal: Medications  Outcome: Progressing Towards Goal  Goal: Respiratory  Outcome: Progressing Towards Goal  Goal: Treatments/Interventions/Procedures  Outcome: Progressing Towards Goal  Goal: Psychosocial  Outcome: Progressing Towards Goal  Goal: *Optimal pain control at patient's stated goal  Outcome: Progressing Towards Goal  Goal: *Hemodynamically stable  Outcome: Progressing Towards Goal  Goal: *Tolerating diet  Outcome: Progressing Towards Goal  Goal: *Demonstrates progressive activity  Outcome: Progressing Towards Goal     Problem: Upper and Lower GI Bleed: Day 3  Goal: Off Pathway (Use only if patient is Off Pathway)  Outcome: Progressing Towards Goal  Goal: Activity/Safety  Outcome: Progressing Towards Goal  Goal: Diagnostic Test/Procedures  Outcome: Progressing Towards Goal  Goal: Nutrition/Diet  Outcome: Progressing Towards Goal  Goal: Discharge Planning  Outcome: Progressing Towards Goal  Goal: Medications  Outcome: Progressing Towards Goal  Goal: Treatments/Interventions/Procedures  Outcome: Progressing Towards Goal  Goal: Psychosocial  Outcome: Progressing Towards Goal     Problem: Upper and Lower GI Bleed:  Discharge Outcomes  Goal: *Hemodynamically stable  Outcome: Progressing Towards Goal  Goal: *Lungs clear or at baseline  Outcome: Progressing Towards Goal  Goal: *Demonstrates independent activity or return to baseline  Outcome: Progressing Towards Goal  Goal: *Pain is controlled to three or less  Outcome: Progressing Towards Goal  Goal: *Verbalizes understanding and describes prescribed diet  Outcome: Progressing Towards Goal  Goal: *Tolerating diet  Outcome: Progressing Towards Goal  Goal: *Verbalizes name, dosage, time, side effects, and number of days to continue medications  Outcome: Progressing Towards Goal  Goal: *Anxiety reduced or absent  Outcome: Progressing Towards Goal  Goal: *Understands and describes signs and symptoms to report to providers(Stroke Metric)  Outcome: Progressing Towards Goal  Goal: *Describes follow-up/return visits to physicians  Outcome: Progressing Towards Goal  Goal: *Describes available resources and support systems  Outcome: Progressing Towards Goal

## 2020-07-18 NOTE — ED NOTES
TRANSFER - OUT REPORT:    Verbal report given to ABHISHEK Bajwa(name) on [facility]  being transferred to 2 Central(unit) for routine progression of care       Report consisted of patients Situation, Background, Assessment and   Recommendations(SBAR). Information from the following report(s) SBAR, ED Summary, [facility] and Recent Results was reviewed with the receiving nurse. Lines:   Peripheral IV 07/17/20 Left Antecubital (Active)   Site Assessment Clean, dry, & intact 07/17/20 1616   Phlebitis Assessment 0 07/17/20 1616   Infiltration Assessment 0 07/17/20 1616   Dressing Status Clean, dry, & intact 07/17/20 1616   Hub Color/Line Status Pink 07/17/20 1616        Opportunity for questions and clarification was provided.       Patient transported with:   [employer]

## 2020-07-18 NOTE — ROUTINE PROCESS
TRANSFER - IN REPORT:    Verbal report received from Kavin Serna RN(name) on Deepa Ax  being received from ED(unit) for routine progression of care      Report consisted of patients Situation, Background, Assessment and   Recommendations(SBAR). Information from the following report(s) SBAR, Kardex, ED Summary, Intake/Output and Recent Results was reviewed with the receiving nurse. Opportunity for questions and clarification was provided. Assessment completed upon patients arrival to unit and care assumed.

## 2020-07-18 NOTE — CONSULTS
Consult Note    Patient: Magaly Hidalgo               Sex: female          DOA: 7/17/2020         YOB: 1935      Age:  80 y.o.        LOS:  LOS: 1 day              HPI:     Magaly Hidalgo is a 80 y.o. female with a hx of atrial flutter and cardiomyopathy on Xarelto. She presented to the hospital yesterday with a hx of recurrent maroon-colored rectal bleeding, without abd pain, nausea or vomiting. She was found to be profoundly anemic with a Hg jovani of 5.8 gm. She received two units of blood. She has never had a colonoscopy. Past Medical History:   Diagnosis Date    Atrial flutter ([address] 02/2015    Cardiomyopathy ([facility] [address]     EF 30% (2/15)    Hypertension     Mitral regurgitation        History reviewed. No pertinent surgical history. History reviewed. No pertinent family history. Social History     Socioeconomic History    Marital status:      Spouse name: Not on file    Number of children: Not on file    Years of education: Not on file    Highest education level: Not on file   Tobacco Use    Smoking status: Never Smoker    Smokeless tobacco: Never Used   Substance and Sexual Activity    Alcohol use: No    Drug use: No       Prior to Admission medications    Medication Sig Start Date End Date Taking? Authorizing Provider   metoprolol (LOPRESSOR) 25 mg tablet Take 1 Tab by mouth every twelve (12) hours. 2/24/15  Yes Maria Allen MD   lisinopril (PRINIVIL, ZESTRIL) 5 mg tablet Take 1 Tab by mouth daily. 2/24/15  Yes Maria Allen MD   rivaroxaban (XARELTO) 20 mg tab tablet Take 1 Tab by mouth daily (with breakfast). 2/25/15   Maria Allen MD   furosemide (LASIX) 40 mg tablet Take 1 Tab by mouth daily. 2/24/15   Maria Allen MD   potassium chloride (KAON 10%) 20 mEq/15 mL solution Take 15 mL by mouth daily. 2/24/15   Maria Allen MD   traMADol (ULTRAM) 50 mg tablet Take 1 Tab by mouth every six (6) hours as needed for Pain.  11/7/13   Roosevelt Porras MAULIK Omalley       No Known Allergies    Review of Systems  A comprehensive review of systems was negative except for that written in the History of Present Illness.       Physical Exam:      Visit Vitals  /76 (BP 1 Location: Left arm, BP Patient Position: Head of bed elevated (Comment degrees))   Pulse 74   Temp 98.6 °F (37 °C)   Resp 18   Ht 5' 4\" (1.626 m)   Wt 76.7 kg (169 lb)   SpO2 100%   BMI 29.01 kg/m²       Physical Exam:  Constitutional: negative  Eyes: negative  Ears, nose, mouth, throat, and face: negative  Respiratory: negative  Cardiovascular: negative  Gastrointestinal: negative  Genitourinary:negative  Integument/breast: negative  Hematologic/lymphatic: negative  Musculoskeletal:negative  Neurological: negative    Labs Reviewed:  BMP:   Lab Results   Component Value Date/Time     07/18/2020 03:40 AM    K 4.3 07/18/2020 03:40 AM     (H) 07/18/2020 03:40 AM    CO2 25 07/18/2020 03:40 AM    AGAP 3 07/18/2020 03:40 AM     (H) 07/18/2020 03:40 AM    BUN 13 07/18/2020 03:40 AM    CREA 0.96 07/18/2020 03:40 AM    GFRAA >60 07/18/2020 03:40 AM    GFRNA 55 (L) 07/18/2020 03:40 AM     CMP:   Lab Results   Component Value Date/Time     07/18/2020 03:40 AM    K 4.3 07/18/2020 03:40 AM     (H) 07/18/2020 03:40 AM    CO2 25 07/18/2020 03:40 AM    AGAP 3 07/18/2020 03:40 AM     (H) 07/18/2020 03:40 AM    BUN 13 07/18/2020 03:40 AM    CREA 0.96 07/18/2020 03:40 AM    GFRAA >60 07/18/2020 03:40 AM    GFRNA 55 (L) 07/18/2020 03:40 AM    CA 7.8 (L) 07/18/2020 03:40 AM    MG 2.3 07/18/2020 03:40 AM    PHOS 3.0 07/18/2020 03:40 AM    ALB 2.8 (L) 07/18/2020 03:40 AM    TP 5.3 (L) 07/18/2020 03:40 AM    GLOB 2.5 07/18/2020 03:40 AM    AGRAT 1.1 07/18/2020 03:40 AM    ALT 10 (L) 07/18/2020 03:40 AM     CBC:   Lab Results   Component Value Date/Time    WBC 6.6 07/18/2020 03:40 AM    HGB 7.0 (L) 07/18/2020 11:09 AM    HCT 21.9 (L) 07/18/2020 11:09 AM     07/18/2020 03:40 AM COAGS:   Lab Results   Component Value Date/Time    PTP 21.0 (H) 07/18/2020 03:40 AM    INR 1.9 (H) 07/18/2020 03:40 AM             Assessment/Plan     BS is an 81 yo woman with rectal bleeding in the form of painless dark red bleeding x 5-6 days and profound anemia in the setting of anticoagulation with Rivaroxaban. Suspect lower GI bleed secondary to diverticular disease. Other considerations include vascular, inflammatory, or neoplastic lesions of the GI tract. Acid peptic disease of the gastroduodenum is a less likely possibility. I suspect that the bleeding is currently inactive. Plan:  Continue to monitory clinically and by serial Hg.   EGD and colonoscopy on Monday,     Marvina Brittle, MD

## 2020-07-18 NOTE — PROGRESS NOTES
2340: Arrived from ED, AOX4, ambulatory with steady gait; standby assist given; used bathroom- had approx 5 ml of clotted blood from rectum; admission history and assessment done; oriented to unit, room, call bell use; discussed plan of care; NPO as ordered; verbalized understanding of instructions given; NSR on tele    0130: sleeping; NSR on tele; needs within reach    0305: sleeping; IVF infusing well; NPO as ordered    0556: H/H result 5.8/18.2 called to Dr. Concepcion Me; order for 1 unit PRBC obtained    0621: PRBC transfusion initiated    0700: resting in bed; tolerating blood transfusion    Bedside and Verbal shift change report given to BETO Lancaster RN (oncoming nurse) by Marjan Edwards RN (offgoing nurse). Report included the following information SBAR, Kardex, Intake/Output, Recent Results and Cardiac Rhythm NSR BBB.

## 2020-07-18 NOTE — ROUTINE PROCESS
Bedside shift change report given to this RN (oncoming nurse) by Mandy Vaughan RN (offgoing nurse). Report included the following information SBAR, Kardex and Cardiac Rhythm SR. Patient has a second unit of PRBC's infusing at 125 ml/hr. Upon assessment, the patient appears to be tolerating the transfusion well.

## 2020-07-18 NOTE — PROGRESS NOTES
Problem: Falls - Risk of  Goal: *Absence of Falls  Description: Document Barron Marie Fall Risk and appropriate interventions in the flowsheet.   Outcome: Progressing Towards Goal  Note: Fall Risk Interventions:            Medication Interventions: Bed/chair exit alarm, Evaluate medications/consider consulting pharmacy    Elimination Interventions: Call light in reach, Toileting schedule/hourly rounds              Problem: Patient Education: Go to Patient Education Activity  Goal: Patient/Family Education  Outcome: Progressing Towards Goal     Problem: Upper and Lower GI Bleed: Day 1  Goal: Psychosocial  Outcome: Progressing Towards Goal  Goal: *Optimal pain control at patient's stated goal  Outcome: Progressing Towards Goal  Goal: *Hemodynamically stable  Outcome: Progressing Towards Goal  Goal: *Demonstrates progressive activity  Outcome: Progressing Towards Goal     Problem: Upper and Lower GI Bleed: Day 2  Goal: Off Pathway (Use only if patient is Off Pathway)  Outcome: Progressing Towards Goal  Goal: Activity/Safety  Outcome: Progressing Towards Goal  Goal: Consults, if ordered  Outcome: Progressing Towards Goal  Goal: Diagnostic Test/Procedures  Outcome: Progressing Towards Goal  Goal: Nutrition/Diet  Outcome: Progressing Towards Goal  Goal: Discharge Planning  Outcome: Progressing Towards Goal  Goal: Medications  Outcome: Progressing Towards Goal  Goal: Respiratory  Outcome: Progressing Towards Goal  Goal: Treatments/Interventions/Procedures  Outcome: Progressing Towards Goal  Goal: Psychosocial  Outcome: Progressing Towards Goal  Goal: *Optimal pain control at patient's stated goal  Outcome: Progressing Towards Goal  Goal: *Hemodynamically stable  Outcome: Progressing Towards Goal  Goal: *Tolerating diet  Outcome: Progressing Towards Goal  Goal: *Demonstrates progressive activity  Outcome: Progressing Towards Goal     Problem: Upper and Lower GI Bleed: Day 3  Goal: Off Pathway (Use only if patient is Off Pathway)  Outcome: Progressing Towards Goal  Goal: Activity/Safety  Outcome: Progressing Towards Goal  Goal: Diagnostic Test/Procedures  Outcome: Progressing Towards Goal  Goal: Nutrition/Diet  Outcome: Progressing Towards Goal  Goal: Discharge Planning  Outcome: Progressing Towards Goal  Goal: Medications  Outcome: Progressing Towards Goal  Goal: Treatments/Interventions/Procedures  Outcome: Progressing Towards Goal  Goal: Psychosocial  Outcome: Progressing Towards Goal     Problem: Upper and Lower GI Bleed:  Discharge Outcomes  Goal: *Hemodynamically stable  Outcome: Progressing Towards Goal  Goal: *Lungs clear or at baseline  Outcome: Progressing Towards Goal  Goal: *Demonstrates independent activity or return to baseline  Outcome: Progressing Towards Goal  Goal: *Pain is controlled to three or less  Outcome: Progressing Towards Goal  Goal: *Verbalizes understanding and describes prescribed diet  Outcome: Progressing Towards Goal  Goal: *Tolerating diet  Outcome: Progressing Towards Goal  Goal: *Verbalizes name, dosage, time, side effects, and number of days to continue medications  Outcome: Progressing Towards Goal  Goal: *Anxiety reduced or absent  Outcome: Progressing Towards Goal  Goal: *Understands and describes signs and symptoms to report to providers(Stroke Metric)  Outcome: Progressing Towards Goal  Goal: *Describes follow-up/return visits to physicians  Outcome: Progressing Towards Goal  Goal: *Describes available resources and support systems  Outcome: Progressing Towards Goal

## 2020-07-18 NOTE — PROGRESS NOTES
Problem: Discharge Planning  Goal: *Discharge to safe environment  Outcome: Resolved/Met       Home    Reason for Admission:   GI bleed / Coagulopathy / HTN                  RUR Score: 16                 PCP:First and Last name:  Dr. Macarena Espinoza   Name of Practice:    Are you a current patient: Yes/No: yes   Approximate date of last visit:  >3years ago   Can you participate in a virtual visit if needed:     Do you (patient/family) have any concerns for transition/discharge? Not @ this time                 Plan for utilizing home health: Will assess for needs. Current Advanced Directive/Advance Care Plan:  None, explained to her what it is, she declined to complete one @ this time            Transition of Care Plan:      Home with out-pt follow up. Chart reviewed. Met with pt., verified all demographics. Has MCR A&B, no secondary ins. NOK:  Tee Butt, son & Tessa Wooten, daughter. Lives alone. Uses no DME. Independent with ADL's prior to admit, drives. States one of her children will pick her up @ discharge. Will cont to follow for any needs. Stefany Whiteside RN,ext [phone]. Patient has designated her children to participate in his/her discharge plan and to receive any needed information. Name:  Tee Butt  / Tessa Wooten  Address:  Phone number:  [phone] / [phone]. Care Management Interventions  PCP Verified by CM: Yes(Dr. Marylene Rabon, last seen >3yrs ago)  Palliative Care Criteria Met (RRAT>21 & CHF Dx)?: No  Mode of Transport at Discharge:  Other (see comment)(family)  Transition of Care Consult (CM Consult): Discharge Planning  Discharge Durable Medical Equipment: No  Physical Therapy Consult: No  Occupational Therapy Consult: No  Speech Therapy Consult: No  Current Support Network: Lives Alone  Confirm Follow Up Transport: Self  Discharge Location  Discharge Placement: Home

## 2020-07-18 NOTE — CONSULTS
[facility] - Consult Note    Consultation request by Jann Ramsay MD for advice/opinion related to evaluating GI bleed [K92.2]    Date of  Admission: 7/17/2020  3:31 PM   Primary Care Physician:  Janet Rios MD     Assessment:     -Acute blood loss anemia, Hgb 5.8 at presentation, transfusing, worsened due to xarelto  -EKG with sinus tachycardia, RBBB  -h/o paroxysmal A.fib on lopressor/xarelto  -Indeterminate troponin, not consistent with ACS  -Chronic diastolic heart failure, EF 50% by echo 7/18/20, no change from 2015  -h/o HTN  -h/o noncompliance, last seen PMD 3 years ago    Last seen by Dr. Kade Land in 2015     Plan:     Severe anemia is major concern, worsened due to Xarelto for A.fib history. Xarelto held, transfusing. Monitor for CHF, stable today. Further recs pending anemia workup. History of Present Illness: This is a 80 y.o. female admitted for GI bleed [K92.2]. Patient complains of:    Presented to ER yesterday with bloody stools while taking NOAC. No chest pain, dyspnea, syncope. Mild dizziness when standing. Baseline poor historian.       Review of Symptoms:  Except as stated above include:  Constitutional:  Dizzy when standing  Ears, nose, throat:  Negative  Respiratory:  negative  Cardiovascular:  negative  Gastrointestinal: bloody stools  Genitourinary:  negative  Musculoskeletal:  Negative  Neurological:  Negative  Dermatological:  Negative  Hematological: Negative  Endocrinological: Negative  Allergy: Negative  Psychological:  Negative     Past Medical History:     Past Medical History:   Diagnosis Date    Atrial flutter [facility] [address] 02/2015    Cardiomyopathy ([facility]     EF 30% (2/15)    Hypertension     Mitral regurgitation          Social History:     Social History     Socioeconomic History    Marital status:      Spouse name: Not on file    Number of children: Not on file    Years of education: Not on file    Highest education level: Not on file   Tobacco Use    Smoking status: Never Smoker    Smokeless tobacco: Never Used   Substance and Sexual Activity    Alcohol use: No    Drug use: No        Family History:   History reviewed. No pertinent family history. Medications:   No Known Allergies     Current Facility-Administered Medications   Medication Dose Route Frequency    naloxone (NARCAN) injection 0.4 mg  0.4 mg IntraVENous PRN    phytonadione (vitamin K1) (AQUA-MEPHYTON) 10 mg/mL injection        0.9% sodium chloride infusion 250 mL  250 mL IntraVENous PRN    0.9% sodium chloride infusion 250 mL  250 mL IntraVENous PRN    metoprolol tartrate (LOPRESSOR) tablet 25 mg  25 mg Oral Q12H    traMADoL (ULTRAM) tablet 50 mg  50 mg Oral Q6H PRN    sodium chloride (NS) flush 5-40 mL  5-40 mL IntraVENous Q8H    sodium chloride (NS) flush 5-40 mL  5-40 mL IntraVENous PRN    pantoprazole (PROTONIX) 40 mg in 0.9% sodium chloride 10 mL injection  40 mg IntraVENous Q12H    dextrose 5% - 0.45% NaCl with KCl 10 mEq/L infusion  50 mL/hr IntraVENous CONTINUOUS         Physical Exam:     Visit Vitals  /57 (BP 1 Location: Left arm, BP Patient Position: At rest)   Pulse 79   Temp 99.3 °F (37.4 °C)   Resp 18   Ht 5' 4\" (1.626 m)   Wt 76.7 kg (169 lb)   SpO2 100%   BMI 29.01 kg/m²     BP Readings from Last 3 Encounters:   07/18/20 131/57   06/18/15 124/65   03/17/15 128/76     Pulse Readings from Last 3 Encounters:   07/18/20 79   06/18/15 63   03/17/15 62     Wt Readings from Last 3 Encounters:   07/18/20 76.7 kg (169 lb)   06/18/15 73.9 kg (163 lb)   03/17/15 76.7 kg (169 lb)       General:  alert, cooperative, no distress, appears stated age  Neck:  nontender  Lungs:  clear to auscultation bilaterally  Heart:  tachy, S1, S2 normal, no murmur, click, rub or gallop  Abdomen:  abdomen is soft without significant tenderness, masses, organomegaly or guarding  Extremities:  extremities normal, atraumatic, no cyanosis or edema  Skin: Warm and dry. no hyperpigmentation, vitiligo, or suspicious lesions  Neuro: alert, oriented x3, affect appropriate, no focal neurological deficits, moves all extremities well, no involuntary movements, reflexes at knee and ankle intact  Psych: non focal     Data Review:     Recent Labs     07/18/20  0340 07/17/20  1610   WBC 6.6 6.2   HGB 5.8* 6.4*   HCT 18.2* 20.0*    229     Recent Labs     07/18/20  0340 07/17/20  1610    142   K 4.3 3.8   * 113*   CO2 25 24   * 146*   BUN 13 14   CREA 0.96 1.16   CA 7.8* 8.3*   MG 2.3  --    PHOS 3.0  --    ALB 2.8* 3.3*   ALT 10* 14   INR 1.9* 2.7*       Results for orders placed or performed during the hospital encounter of 07/17/20   EKG, 12 LEAD, INITIAL   Result Value Ref Range    Ventricular Rate 113 BPM    Atrial Rate 113 BPM    P-R Interval 176 ms    QRS Duration 138 ms    Q-T Interval 432 ms    QTC Calculation (Bezet) 592 ms    Calculated R Axis -34 degrees    Calculated T Axis 16 degrees    Diagnosis       Sinus tachycardia with occasional premature ventricular complexes  Left axis deviation  Right bundle branch block  Minimal voltage criteria for LVH, may be normal variant ( R in aVL )  Abnormal ECG  When compared with ECG of 19-FEB-2015 16:01,  Significant changes have occurred         All Cardiac Markers in the last 24 hours:    Lab Results   Component Value Date/Time     07/17/2020 04:10 PM    CKMB 1.7 07/17/2020 04:10 PM    CKND1 1.1 07/17/2020 04:10 PM    TROIQ 0.08 (H) 07/17/2020 04:10 PM       Last Lipid:    Lab Results   Component Value Date/Time    Cholesterol, total 127 02/18/2015 08:40 PM    HDL Cholesterol 40 02/18/2015 08:40 PM    LDL, calculated 67.2 02/18/2015 08:40 PM    Triglyceride 99 02/18/2015 08:40 PM    CHOL/HDL Ratio 3.2 02/18/2015 08:40 PM       Signed By: Domingo Stevenson MD     July 18, 2020

## 2020-07-18 NOTE — PROGRESS NOTES
Internal Medicine Progress Note        NAME: Vega Dunn   :  1935  MRM:  [id number]    Date/Time: 2020        ASSESSMENT/PLAN: pending GI consult        #   GI bleed (2020). Presented as rectal bleeding. PPI IV . GI consulted. IVF. Serial H/H. Monitor lytes. Maintain good IV access. Maintain vital signs. Avoid NSAID.          # Coagulopathy. INR elevated. She is on Xarelto. No need for active reversal with plasma agents as no active bleeding. Subcutaneous vitamin K and observe. Monitor INR.     #   HTN (hypertension) (2015) control blood pressure. Placed parameter to avoid hypotension especially with GI bleed     #  History of chronic diastolic CHF (congestive heart failure) and coagulopathy ([address] (2020). Home regimen when appropriate. She does not follow with cardiologist.      #  Acute blood loss anemia (2020) blood transfusion according to guideline    2 units of blood transfusion so far    # Does not have medical follow-up and noncompliance with medication. She report last time seen PCP about 3 years ago. She take her medications infrequently and that is why it last long that time. John Rein -DVT prophylaxis :  SCD.   - Code Status : full     IP CONSULT TO GASTROENTEROLOGY  IP CONSULT TO HOSPITALIST  IP CONSULT TO CARDIOLOGY    Lab Review:     Recent Labs     20  0340 20  1610   WBC 6.6 6.2   HGB 5.8* 6.4*   HCT 18.2* 20.0*    229     Recent Labs     20  0340 20  1610    142   K 4.3 3.8   * 113*   CO2 25 24   * 146*   BUN 13 14   CREA 0.96 1.16   CA 7.8* 8.3*   MG 2.3  --    PHOS 3.0  --    ALB 2.8* 3.3*   TBILI 0.4 0.3   ALT 10* 14   INR 1.9* 2.7*     No results found for: GLUCPOC  No results for input(s): PH, PCO2, PO2, HCO3, FIO2 in the last 72 hours.   Recent Labs     20  0340 20  1610   INR 1.9* 2.7*       No results found for: SDES  No results found for: CULT    All Cardiac Markers in the last 24 hours:   Lab Results   Component Value Date/Time     07/17/2020 04:10 PM    CKMB 1.7 07/17/2020 04:10 PM    CKND1 1.1 07/17/2020 04:10 PM    TROIQ 0.08 (H) 07/17/2020 04:10 PM           Intervals noted   Pt s/e @ bedside     Subjective:     Chief Complaint:      Offer no complain , no pains. No bleeding with BM.    ROS:  (bold if positive,otherwise negative)    Fever/chills ,  Dysuria   Cough , Sputum , SOB/FRIEDMAN , Chest Pain     Diarrhea ,Nausea/Vomit , Abd Pain , Constipation     Tolerating Diet                Objective:     Vitals:  Last 24hrs VS reviewed since prior progress note. Most recent are:    Visit Vitals  /57   Pulse 79   Temp 99.3 °F (37.4 °C)   Resp 18   Ht 5' 4\" (1.626 m)   Wt 76.7 kg (169 lb)   SpO2 100%   BMI 29.01 kg/m²     SpO2 Readings from Last 6 Encounters:   07/18/20 100%   06/18/15 98%   03/17/15 99%   02/24/15 96%   11/07/13 100%            Intake/Output Summary (Last 24 hours) at 7/18/2020 1057  Last data filed at 7/18/2020 0858  Gross per 24 hour   Intake 1077.93 ml   Output 400 ml   Net 677.93 ml          Physical Exam:   Gen:  Appear stated age, Well-developed, well-nourished, in no acute distress  HEENT: Pale conjunctiva head atraumatic, normocephalic , hearing intact to voice, moist mucous membranes. Neck:   Trachea midline , No apparent JVD, Supple, without masses, thyroid non-tender  Resp:  No accessory muscle use,Bilateral BS present, clear breath sounds without wheezes rales or rhonchi  Card:   normal S1, S2 without Gallop . No Significant lower leg peripheral edema. Abd:  Soft, non-tender, non-distended, bowel sounds are present. Musc:  No cyanosis or clubbing. Skin:  No rashes or ulcers, skin turgor is good. Neuro:  Cranial nerves are grossly intact, no clear area of focal motor weakness, follows commands appropriately.   Psych:   oriented to person, place and time, alert.       Medications Reviewed: (see below)    Lab Data Reviewed: (see below)    ______________________________________________________________________    Medications:     Current Facility-Administered Medications   Medication Dose Route Frequency    naloxone (NARCAN) injection 0.4 mg  0.4 mg IntraVENous PRN    phytonadione (vitamin K1) (AQUA-MEPHYTON) 10 mg/mL injection        0.9% sodium chloride infusion 250 mL  250 mL IntraVENous PRN    0.9% sodium chloride infusion 250 mL  250 mL IntraVENous PRN    furosemide (LASIX) injection 20 mg  20 mg IntraVENous ONCE    0.9% sodium chloride infusion 250 mL  250 mL IntraVENous PRN    metoprolol tartrate (LOPRESSOR) tablet 25 mg  25 mg Oral Q12H    traMADoL (ULTRAM) tablet 50 mg  50 mg Oral Q6H PRN    sodium chloride (NS) flush 5-40 mL  5-40 mL IntraVENous Q8H    sodium chloride (NS) flush 5-40 mL  5-40 mL IntraVENous PRN    pantoprazole (PROTONIX) 40 mg in 0.9% sodium chloride 10 mL injection  40 mg IntraVENous Q12H    dextrose 5% - 0.45% NaCl with KCl 10 mEq/L infusion  50 mL/hr IntraVENous CONTINUOUS          Total time spent with patient: 35 minutes                  Care Plan discussed with: Patient, Nursing Staff and >50% of time spent in counseling and coordination of care    Discussed:  Care Plan    Prophylaxis:  SCD's    Disposition:  Home w/Family           This document in whole or part of it has been produced using voice recognition software. Unrecognized errors in transcription may be present.     Attending Physician: Cynthia Hdz MD

## 2020-07-19 LAB
ANION GAP SERPL CALC-SCNC: 3 MMOL/L (ref 3–18)
BASOPHILS # BLD: 0 K/UL (ref 0–0.1)
BASOPHILS NFR BLD: 0 % (ref 0–2)
BUN SERPL-MCNC: 11 MG/DL (ref 7–18)
BUN/CREAT SERPL: 10 (ref 12–20)
CALCIUM SERPL-MCNC: 8.1 MG/DL (ref 8.5–10.1)
CHLORIDE SERPL-SCNC: 111 MMOL/L (ref 100–111)
CO2 SERPL-SCNC: 26 MMOL/L (ref 21–32)
CREAT SERPL-MCNC: 1.1 MG/DL (ref 0.6–1.3)
DIFFERENTIAL METHOD BLD: ABNORMAL
EOSINOPHIL # BLD: 0.2 K/UL (ref 0–0.4)
EOSINOPHIL NFR BLD: 3 % (ref 0–5)
ERYTHROCYTE [DISTWIDTH] IN BLOOD BY AUTOMATED COUNT: 15.6 % (ref 11.6–14.5)
GLUCOSE SERPL-MCNC: 93 MG/DL (ref 74–99)
HCT VFR BLD AUTO: 21.4 % (ref 35–45)
HCT VFR BLD AUTO: 25.7 % (ref 35–45)
HGB BLD-MCNC: 6.9 G/DL (ref 12–16)
HGB BLD-MCNC: 8.5 G/DL (ref 12–16)
INR PPP: 1.7 (ref 0.8–1.2)
LYMPHOCYTES # BLD: 2.1 K/UL (ref 0.9–3.6)
LYMPHOCYTES NFR BLD: 33 % (ref 21–52)
MCH RBC QN AUTO: 28.5 PG (ref 24–34)
MCHC RBC AUTO-ENTMCNC: 32.2 G/DL (ref 31–37)
MCV RBC AUTO: 88.4 FL (ref 74–97)
MONOCYTES # BLD: 0.4 K/UL (ref 0.05–1.2)
MONOCYTES NFR BLD: 7 % (ref 3–10)
NEUTS SEG # BLD: 3.6 K/UL (ref 1.8–8)
NEUTS SEG NFR BLD: 57 % (ref 40–73)
PLATELET # BLD AUTO: 184 K/UL (ref 135–420)
PMV BLD AUTO: 11 FL (ref 9.2–11.8)
POTASSIUM SERPL-SCNC: 3.6 MMOL/L (ref 3.5–5.5)
PROTHROMBIN TIME: 19.4 SEC (ref 11.5–15.2)
RBC # BLD AUTO: 2.42 M/UL (ref 4.2–5.3)
SODIUM SERPL-SCNC: 140 MMOL/L (ref 136–145)
TROPONIN I SERPL-MCNC: 0.1 NG/ML (ref 0–0.04)
WBC # BLD AUTO: 6.2 K/UL (ref 4.6–13.2)

## 2020-07-19 PROCEDURE — 74011000250 HC RX REV CODE- 250: Performed by: INTERNAL MEDICINE

## 2020-07-19 PROCEDURE — 74011250637 HC RX REV CODE- 250/637: Performed by: INTERNAL MEDICINE

## 2020-07-19 PROCEDURE — 85018 HEMOGLOBIN: CPT

## 2020-07-19 PROCEDURE — 85610 PROTHROMBIN TIME: CPT

## 2020-07-19 PROCEDURE — 80048 BASIC METABOLIC PNL TOTAL CA: CPT

## 2020-07-19 PROCEDURE — 85025 COMPLETE CBC W/AUTO DIFF WBC: CPT

## 2020-07-19 PROCEDURE — 65660000000 HC RM CCU STEPDOWN

## 2020-07-19 PROCEDURE — 36415 COLL VENOUS BLD VENIPUNCTURE: CPT

## 2020-07-19 PROCEDURE — 74011250636 HC RX REV CODE- 250/636: Performed by: INTERNAL MEDICINE

## 2020-07-19 PROCEDURE — C9113 INJ PANTOPRAZOLE SODIUM, VIA: HCPCS | Performed by: INTERNAL MEDICINE

## 2020-07-19 PROCEDURE — 84484 ASSAY OF TROPONIN QUANT: CPT

## 2020-07-19 PROCEDURE — 36430 TRANSFUSION BLD/BLD COMPNT: CPT

## 2020-07-19 PROCEDURE — P9016 RBC LEUKOCYTES REDUCED: HCPCS

## 2020-07-19 RX ORDER — SODIUM CHLORIDE 9 MG/ML
250 INJECTION, SOLUTION INTRAVENOUS AS NEEDED
Status: DISCONTINUED | OUTPATIENT
Start: 2020-07-19 | End: 2020-07-21 | Stop reason: HOSPADM

## 2020-07-19 RX ORDER — POLYETHYLENE GLYCOL 3350, SODIUM SULFATE, SODIUM CHLORIDE, POTASSIUM CHLORIDE, SODIUM ASCORBATE, AND ASCORBIC ACID 7.5-2.691G
2 KIT ORAL EVERY 12 HOURS
Status: COMPLETED | OUTPATIENT
Start: 2020-07-19 | End: 2020-07-20

## 2020-07-19 RX ADMIN — Medication 10 ML: at 14:00

## 2020-07-19 RX ADMIN — FAMOTIDINE 40 MG: 10 INJECTION, SOLUTION INTRAVENOUS at 03:54

## 2020-07-19 RX ADMIN — DEXTROSE MONOHYDRATE, SODIUM CHLORIDE, AND POTASSIUM CHLORIDE 50 ML/HR: 50; 4.5; .745 INJECTION, SOLUTION INTRAVENOUS at 06:26

## 2020-07-19 RX ADMIN — METOPROLOL TARTRATE 25 MG: 25 TABLET, FILM COATED ORAL at 10:22

## 2020-07-19 RX ADMIN — POLYETHYLENE GLYCOL 3350, SODIUM SULFATE, SODIUM CHLORIDE, POTASSIUM CHLORIDE, ASCORBIC ACID, SODIUM ASCORBATE 2 L: KIT at 15:52

## 2020-07-19 RX ADMIN — SODIUM CHLORIDE 40 MG: 9 INJECTION, SOLUTION INTRAMUSCULAR; INTRAVENOUS; SUBCUTANEOUS at 10:22

## 2020-07-19 NOTE — PROGRESS NOTES
Problem: Falls - Risk of  Goal: *Absence of Falls  Description: Document Luis Patel Fall Risk and appropriate interventions in the flowsheet.   Outcome: Progressing Towards Goal  Note: Fall Risk Interventions:  Mobility Interventions: Bed/chair exit alarm, Communicate number of staff needed for ambulation/transfer, Patient to call before getting OOB         Medication Interventions: Assess postural VS orthostatic hypotension, Patient to call before getting OOB    Elimination Interventions: Call light in reach, Stay With Me (per policy), Patient to call for help with toileting needs              Problem: Upper and Lower GI Bleed: Day 1  Goal: Off Pathway (Use only if patient is Off Pathway)  Outcome: Progressing Towards Goal     Problem: Upper and Lower GI Bleed: Day 3  Goal: Activity/Safety  Outcome: Progressing Towards Goal     Problem: Upper and Lower GI Bleed: Day 3  Goal: Nutrition/Diet  Outcome: Progressing Towards Goal     Problem: Upper and Lower GI Bleed:  Discharge Outcomes  Goal: *Hemodynamically stable  Outcome: Progressing Towards Goal     Problem: Pain  Goal: *Control of Pain  Outcome: Progressing Towards Goal     Problem: Nutrition Deficit  Goal: *Optimize nutritional status  Outcome: Progressing Towards Goal

## 2020-07-19 NOTE — PROGRESS NOTES
Assume care of patient lying in bed. Alert and oriented X 4. Denies pain or discomfort at present. Bed locked in lowest position. Call light within reach and understand to use for assistance and needs. 2200 Ambulatory to the bathroom with assist of 1. Noted with steady gait while ambulatory. 07/18/2020  0000 Resting quietly at present without complaints voiced     0200 Critical called to Dr. Jolley Client with Hgb 6.9 and HCT 15.2. New order for 1 unit of PRBC's to be administered. 0355 PRBC's infusing without complaints. 0630  PRBC unit completed without adverse reaction. H & H after 2 hrs of transfusion complete. 0380 Bedside and Verbal shift change report given to Katherine Lopez (oncoming nurse) by Danny Beatty RN (offgoing nurse). Report given with SBAR, Kardex, Intake/Output, MAR and Recent Results.

## 2020-07-19 NOTE — PROGRESS NOTES
[facility] - Progress Note  Admit Date: 7/17/2020    Assessment:     -Acute blood loss anemia, Hgb 5.8 at presentation, transfusing, worsened due to xarelto. GI following. -EKG with sinus tachycardia, RBBB  -h/o paroxysmal A.fib on lopressor/xarelto  -Indeterminate troponin, not consistent with ACS  -Chronic diastolic heart failure, EF 50% by echo 7/18/20, no change from 2015  -h/o HTN  -h/o noncompliance, last seen PMD 3 years ago     Last seen by Dr. Fantasma Burnett in 2015    Plan:     [facility] held, GI planning scope tomorrow. Echo reviewed with normal EF, ok to proceed from cardiac standpoint. Subjective:     No new complaints.      Objective:      Patient Vitals for the past 8 hrs:   Temp Pulse Resp BP SpO2   07/19/20 0630 98.2 °F (36.8 °C) 77 16 145/70 (!) 88 %   07/19/20 0429 98.2 °F (36.8 °C) 78 16 140/72 99 %   07/19/20 0414 98.3 °F (36.8 °C) 74 18 145/79 100 %   07/19/20 0356 98.3 °F (36.8 °C) 75 18 133/68 100 %   07/19/20 0338 98.2 °F (36.8 °C) 80 18 157/69 100 %         Patient Vitals for the past 96 hrs:   Weight   07/18/20 1018 76.7 kg (169 lb)   07/18/20 0005 76.7 kg (169 lb)   07/17/20 1528 68 kg (150 lb)                    Intake/Output Summary (Last 24 hours) at 7/19/2020 0900  Last data filed at 7/19/2020 0630  Gross per 24 hour   Intake 320.8 ml   Output 600 ml   Net -279.2 ml       Physical Exam:  General:  alert, cooperative, no distress, appears stated age  Neck:  nontender  Lungs:  clear to auscultation bilaterally  Heart:  regular rate and rhythm, S1, S2 normal, no murmur, click, rub or gallop  Abdomen:  abdomen is soft without significant tenderness, masses, organomegaly or guarding  Extremities:  extremities normal, atraumatic, no cyanosis or edema    Data Review:     Labs: Results:       Chemistry Recent Labs     07/19/20  0054 07/18/20  0340 07/17/20  1610   GLU 93 110* 146*    143 142   K 3.6 4.3 3.8    115* 113*   CO2 26 25 24   BUN 11 13 14   CREA 1.10 0. 96 1.16   CA 8.1* 7.8* 8.3*   MG  --  2.3  --    PHOS  --  3.0  --    AGAP 3 3 5   BUCR 10* 14 12   AP  --  42* 52   TP  --  5.3* 7.0   ALB  --  2.8* 3.3*   GLOB  --  2.5 3.7   AGRAT  --  1.1 0.9      CBC w/Diff Recent Labs     07/19/20  0054 07/18/20  1109 07/18/20  0340 07/17/20  1610   WBC 6.2  --  6.6 6.2   RBC 2.42*  --  2.03* 2.24*   HGB 6.9* 7.0* 5.8* 6.4*   HCT 21.4* 21.9* 18.2* 20.0*     --  166 229   GRANS 57  --  58 62   LYMPH 33  --  33 31   EOS 3  --  2 1      Cardiac Enzymes Lab Results   Component Value Date/Time    TROIQ 0.10 (H) 07/19/2020 12:54 AM    TROIQ 0.09 (H) 07/18/2020 11:09 AM      Coagulation Recent Labs     07/19/20  0054 07/18/20  0340   PTP 19.4* 21.0*   INR 1.7* 1.9*       Lipid Panel Lab Results   Component Value Date/Time    Cholesterol, total 127 02/18/2015 08:40 PM    HDL Cholesterol 40 02/18/2015 08:40 PM    LDL, calculated 67.2 02/18/2015 08:40 PM    VLDL, calculated 19.8 02/18/2015 08:40 PM    Triglyceride 99 02/18/2015 08:40 PM    CHOL/HDL Ratio 3.2 02/18/2015 08:40 PM      BNP No results found for: BNP, BNPP, XBNPT   Liver Enzymes Recent Labs     07/18/20  0340   TP 5.3*   ALB 2.8*   AP 42*      Digoxin    Thyroid Studies Lab Results   Component Value Date/Time    TSH 3.39 02/18/2015 08:40 PM          Signed By: Susie Max MD     July 19, 2020

## 2020-07-19 NOTE — PROGRESS NOTES
INTERIM UPDATE - 8384 EST on 7/19/2020    Nursing Staff reports that in this Patient who is S/P Transfusion of 2 units of PRBCs, Hgb has returned 6.9. Plan:  Transfuse an additional unit of PRBCs with IV Famotidine and H&H recheck. Nursing Staff aware. Blood Bank is aware.

## 2020-07-19 NOTE — PROGRESS NOTES
Comprehensive Nutrition Assessment    Type and Reason for Visit: Positive nutrition screen    Nutrition Recommendations/Plan:     1. Initiate Diet: Full liquids per patient tolerance  2. Monitor labs, weight and document all PO intake  3. Obtain current weight to confirm       Nutrition Assessment:  Patient admitted for GI bleed and MST received for recent 2-13 lb weight loss with decrease in appetite for 1 week. GI following: plan for EGD and colonoscopy on Monday (7/20). Pt seen in room this afternoon OOB in chair. Unable to verify weight during visit. Diet being advanced to liquids now. Malnutrition Assessment:  Malnutrition Status:  No malnutrition      Nutrition History and Allergies: NKFA or problems chewing/swallowing. Good appetite normally but noted a decrease over past 2-3 days. Estimated Daily Nutrient Needs:  Energy (kcal):  5453-0440  Protein (g):  61-77       Fluid (ml/day):  1 mL/kcal    Nutrition Related Findings:  No GI discomfort and Last BM on (7/17) per I/Os      Wounds:    None       Additional Caloric Sources: IVF: D5 1/2 NS with KCl 10 mEq/l at 50 mL/hr (204 kcal/day)     Current Nutrition Therapies:   DIET FULL LIQUID  DIET CLEAR LIQUID    Anthropometric Measures:  · Height:  5' 4\" (162.6 cm)  · Current Body Wt:  76.7 kg (169 lb 1.5 oz)(per EMR)   · Admission Body Wt:  169 lb 1.5 oz    · Usual Body Wt:  169 lb(165-170 lb per documented Hx but 150 lb per patient)     · BMI Categories:  Overweight (BMI 25.0-29. 9)       Nutrition Diagnosis:   · Inadequate oral intake related to altered GI function as evidenced by NPO or clear liquid status due to medical condition, poor intake prior to admission    Nutrition Interventions:   Food and/or Nutrient Delivery: Start oral diet  Nutrition Education and Counseling: No recommendations at this time  Coordination of Nutrition Care: Continued inpatient monitoring    Goals:  PO nutrition intake will meet >75% of patient estimated nutritional needs within the next 7 days. Nutrition Monitoring and Evaluation:   Food/Nutrient Intake Outcomes: Diet advancement/tolerance, Food and nutrient intake, IVF intake  Physical Signs/Symptoms Outcomes: Biochemical data, GI status, Weight    Discharge Planning:     Too soon to determine     Electronically signed by Sharon Erickson on 7/18/2020 at 8:25 PM    Pager: [phone]

## 2020-07-19 NOTE — PROGRESS NOTES
Internal Medicine Progress Note        NAME: Ferny Cotton   :  1935  MRM:  [id number]    Date/Time: 2020        ASSESSMENT/PLAN:        #  GI bleed (2020). scoping in am per GI. Presented as rectal bleeding. PPI IV . GI consulted. IVF. Serial H/H. Monitor lytes. Maintain good IV access. Maintain vital signs. Avoid NSAID.        # Coagulopathy. INR elevated. She is on Xarelto. No need for active reversal with plasma agents as no active bleeding. Subcutaneous vitamin K and observe. Monitor INR. #  History of chronic diastolic CHF (congestive heart failure) and coagulopathy ([address] (2020). Home regimen when appropriate. She does not follow with cardiologist.      #  Acute blood loss anemia (2020) blood transfusion according to guideline   3 units of blood transfusion so far. #  Mild elevation trop. TTE with diastolic HF. Cardiology on board.      #   HTN (hypertension) (2015) control blood pressure. Placed parameter to avoid hypotension especially with GI bleed      #   Does not have medical follow-up and noncompliance with medication. She report last time seen PCP about 3 years ago. She take her medications infrequently and that is why it last long that time. Terrance Randhawa      -DVT prophylaxis :  SCD.   - Code Status : full     IP CONSULT TO GASTROENTEROLOGY  IP CONSULT TO HOSPITALIST  IP CONSULT TO CARDIOLOGY    Lab Review:     Recent Labs     20  0054 20  1109 20  0340 20  1610   WBC 6.2  --  6.6 6.2   HGB 6.9* 7.0* 5.8* 6.4*   HCT 21.4* 21.9* 18.2* 20.0*     --  166 229     Recent Labs     20  0054 20  0340 20  1610    143 142   K 3.6 4.3 3.8    115* 113*   CO2 26 25 24   GLU 93 110* 146*   BUN 11 13 14   CREA 1.10 0.96 1.16   CA 8.1* 7.8* 8.3*   MG  --  2.3  --    PHOS  --  3.0  --    ALB  --  2.8* 3.3*   TBILI  --  0.4 0.3   ALT  --  10* 14   INR 1.7* 1.9* 2.7*     No results found for: GLUCPOC  No results for input(s): PH, PCO2, PO2, HCO3, FIO2 in the last 72 hours. Recent Labs     07/19/20  0054 07/18/20  0340 07/17/20  1610   INR 1.7* 1.9* 2.7*       No results found for: SDES  No results found for: CULT    All Cardiac Markers in the last 24 hours:   Lab Results   Component Value Date/Time    TROIQ 0.10 (H) 07/19/2020 12:54 AM    TROIQ 0.09 (H) 07/18/2020 11:09 AM           Intervals noted   Pt s/e @ bedside     Subjective:     Chief Complaint:      Offer no complain , no pains. No bleeding   No BM    ROS:  (bold if positive,otherwise negative)    Fever/chills ,  Dysuria   Cough , Sputum , SOB/FRIEDMAN , Chest Pain     Diarrhea ,Nausea/Vomit , Abd Pain , Constipation     Tolerating Diet                Objective:     Vitals:  Last 24hrs VS reviewed since prior progress note. Most recent are:    Visit Vitals  /70 (BP 1 Location: Left arm)   Pulse 77   Temp 98.2 °F (36.8 °C)   Resp 16   Ht 5' 4\" (1.626 m)   Wt 76.7 kg (169 lb)   SpO2 (!) 88%   BMI 29.01 kg/m²     SpO2 Readings from Last 6 Encounters:   07/19/20 (!) 88%   06/18/15 98%   03/17/15 99%   02/24/15 96%   11/07/13 100%            Intake/Output Summary (Last 24 hours) at 7/19/2020 0718  Last data filed at 7/19/2020 0630  Gross per 24 hour   Intake 647.9 ml   Output 1000 ml   Net -352.1 ml          Physical Exam:   Gen:  Appear stated age, Well-developed, well-nourished, in no acute distress  HEENT: Pale conjunctiva head atraumatic, normocephalic , hearing intact to voice, moist mucous membranes. Neck:   Trachea midline , No apparent JVD, Supple, without masses, thyroid non-tender  Resp:  No accessory muscle use,Bilateral BS present, clear breath sounds without wheezes rales or rhonchi  Card:   normal S1, S2 without Gallop . No Significant lower leg peripheral edema. Abd:  Soft, non-tender, non-distended, bowel sounds are present. Musc:  No cyanosis or clubbing. Skin:  No rashes or ulcers, skin turgor is good.   Neuro:  Cranial nerves are grossly intact, no clear area of focal motor weakness, follows commands appropriately. Psych:   oriented to person, place and time, alert.       Medications Reviewed: (see below)    Lab Data Reviewed: (see below)    ______________________________________________________________________    Medications:     Current Facility-Administered Medications   Medication Dose Route Frequency    0.9% sodium chloride infusion 250 mL  250 mL IntraVENous PRN    naloxone (NARCAN) injection 0.4 mg  0.4 mg IntraVENous PRN    0.9% sodium chloride infusion 250 mL  250 mL IntraVENous PRN    0.9% sodium chloride infusion 250 mL  250 mL IntraVENous PRN    0.9% sodium chloride infusion 250 mL  250 mL IntraVENous PRN    metoprolol tartrate (LOPRESSOR) tablet 25 mg  25 mg Oral Q12H    traMADoL (ULTRAM) tablet 50 mg  50 mg Oral Q6H PRN    sodium chloride (NS) flush 5-40 mL  5-40 mL IntraVENous Q8H    sodium chloride (NS) flush 5-40 mL  5-40 mL IntraVENous PRN    pantoprazole (PROTONIX) 40 mg in 0.9% sodium chloride 10 mL injection  40 mg IntraVENous Q12H    dextrose 5% - 0.45% NaCl with KCl 10 mEq/L infusion  50 mL/hr IntraVENous CONTINUOUS          Total time spent with patient: 35 minutes                  Care Plan discussed with: Patient, Nursing Staff, Consultant/Specialist and >50% of time spent in counseling and coordination of care    Discussed:  Care Plan    Prophylaxis:  SCD's    Disposition:  Home w/Family           This document in whole or part of it has been produced using voice recognition software. Unrecognized errors in transcription may be present.     Attending Physician: Javier Mckay MD

## 2020-07-19 NOTE — PROGRESS NOTES
PROGRESS NOTE   PATIENT:  Eleanor Valenzuela           MRN: [id number]           [facility], [address] 7/19/2020, 8:23 AM          SUBJECTIVE:  No melena or hematochezia. No abd pain, nausea or vomiting. OBJECTIVE:  Patient Vitals for the past 24 hrs:   Temp Pulse Resp BP SpO2   07/19/20 0630 98.2 °F (36.8 °C) 77 16 145/70 (!) 88 %   07/19/20 0429 98.2 °F (36.8 °C) 78 16 140/72 99 %   07/19/20 0414 98.3 °F (36.8 °C) 74 18 145/79 100 %   07/19/20 0356 98.3 °F (36.8 °C) 75 18 133/68 100 %   07/19/20 0338 98.2 °F (36.8 °C) 80 18 157/69 100 %   07/18/20 2248 98.2 °F (36.8 °C) 75 18 126/54 100 %   07/18/20 2211  75  126/54    07/18/20 2000     100 %   07/18/20 1912 98.5 °F (36.9 °C) 78 18 117/55 100 %   07/18/20 1512 98.3 °F (36.8 °C) 77 18 128/76 100 %   07/18/20 1110 98.6 °F (37 °C) 74 18 138/76 100 %   07/18/20 1018    131/57    07/18/20 0858 99.3 °F (37.4 °C) 79 18 131/57 100 %   07/18/20 0839 98.4 °F (36.9 °C) 82 18 132/58 100 %         Intake/Output Summary (Last 24 hours) at 7/19/2020 0823  Last data filed at 7/19/2020 0630  Gross per 24 hour   Intake 647.9 ml   Output 1000 ml   Net -352.1 ml       Gen: NAD  Lungs: Clear B/L   CVS exam: Regular rate and rhythm   Abd  : Soft, non tender, BS +, No masses felt. Labs: Results:   Chemistry Recent Labs     07/19/20  0054 07/18/20  0340 07/17/20  1610   GLU 93 110* 146*    143 142   K 3.6 4.3 3.8    115* 113*   CO2 26 25 24   BUN 11 13 14   CREA 1.10 0.96 1.16   CA 8.1* 7.8* 8.3*   AGAP 3 3 5   BUCR 10* 14 12   AP  --  42* 52   TP  --  5.3* 7.0   ALB  --  2.8* 3.3*   GLOB  --  2.5 3.7   AGRAT  --  1.1 0.9    Estimated Creatinine Clearance: 38.2 mL/min (based on SCr of 1.1 mg/dL).    CBC w/Diff Recent Labs     07/19/20  0054 07/18/20  1109 07/18/20  0340 07/17/20  1610   WBC 6.2  --  6.6 6.2   RBC 2.42*  --  2.03* 2.24*   HGB 6.9* 7.0* 5.8* 6.4*   HCT 21.4* 21.9* 18.2* 20.0*     --  166 229   GRANS 57  --  58 62 LYMPH 33  --  33 31   EOS 3  --  2 1      Cardiac Enzymes Recent Labs     07/17/20  1610      CKND1 1.1      Coagulation Recent Labs     07/19/20  0054 07/18/20  0340   PTP 19.4* 21.0*   INR 1.7* 1.9*           Amylase Lipase    Liver Enzymes Recent Labs     07/18/20  0340 07/17/20  1610   TP 5.3* 7.0   ALB 2.8* 3.3*   TBILI 0.4 0.3   AP 42* 52   ALT 10* 14        No Known Allergies    Current Facility-Administered Medications   Medication Dose Route Frequency    0.9% sodium chloride infusion 250 mL  250 mL IntraVENous PRN    naloxone (NARCAN) injection 0.4 mg  0.4 mg IntraVENous PRN    0.9% sodium chloride infusion 250 mL  250 mL IntraVENous PRN    0.9% sodium chloride infusion 250 mL  250 mL IntraVENous PRN    0.9% sodium chloride infusion 250 mL  250 mL IntraVENous PRN    metoprolol tartrate (LOPRESSOR) tablet 25 mg  25 mg Oral Q12H    traMADoL (ULTRAM) tablet 50 mg  50 mg Oral Q6H PRN    sodium chloride (NS) flush 5-40 mL  5-40 mL IntraVENous Q8H    sodium chloride (NS) flush 5-40 mL  5-40 mL IntraVENous PRN    pantoprazole (PROTONIX) 40 mg in 0.9% sodium chloride 10 mL injection  40 mg IntraVENous Q12H    dextrose 5% - 0.45% NaCl with KCl 10 mEq/L infusion  50 mL/hr IntraVENous CONTINUOUS       ASSESSMENT:  Gi bleed inactive. EGD/colo tomorrow. Give 1 unit of PRBC today.      Marina Shepard MD

## 2020-07-20 ENCOUNTER — ANESTHESIA (OUTPATIENT)
Dept: ENDOSCOPY | Age: 85
DRG: 813 | End: 2020-07-20
Payer: MEDICARE

## 2020-07-20 ENCOUNTER — ANESTHESIA EVENT (OUTPATIENT)
Dept: ENDOSCOPY | Age: 85
DRG: 813 | End: 2020-07-20
Payer: MEDICARE

## 2020-07-20 LAB
ANION GAP SERPL CALC-SCNC: 7 MMOL/L (ref 3–18)
BASOPHILS # BLD: 0 K/UL (ref 0–0.1)
BASOPHILS NFR BLD: 0 % (ref 0–2)
BUN SERPL-MCNC: 10 MG/DL (ref 7–18)
BUN/CREAT SERPL: 10 (ref 12–20)
CALCIUM SERPL-MCNC: 8.3 MG/DL (ref 8.5–10.1)
CHLORIDE SERPL-SCNC: 111 MMOL/L (ref 100–111)
CO2 SERPL-SCNC: 24 MMOL/L (ref 21–32)
CREAT SERPL-MCNC: 1 MG/DL (ref 0.6–1.3)
DIFFERENTIAL METHOD BLD: ABNORMAL
EOSINOPHIL # BLD: 0.1 K/UL (ref 0–0.4)
EOSINOPHIL NFR BLD: 2 % (ref 0–5)
ERYTHROCYTE [DISTWIDTH] IN BLOOD BY AUTOMATED COUNT: 15 % (ref 11.6–14.5)
GLUCOSE SERPL-MCNC: 99 MG/DL (ref 74–99)
HCT VFR BLD AUTO: 24.6 % (ref 35–45)
HCT VFR BLD AUTO: 25 % (ref 35–45)
HCT VFR BLD AUTO: 8.9 % (ref 35–45)
HGB BLD-MCNC: 2.7 G/DL (ref 12–16)
HGB BLD-MCNC: 8 G/DL (ref 12–16)
HGB BLD-MCNC: 8.2 G/DL (ref 12–16)
INR PPP: 1.4 (ref 0.8–1.2)
LYMPHOCYTES # BLD: 1.7 K/UL (ref 0.9–3.6)
LYMPHOCYTES NFR BLD: 29 % (ref 21–52)
MCH RBC QN AUTO: 29 PG (ref 24–34)
MCHC RBC AUTO-ENTMCNC: 32.5 G/DL (ref 31–37)
MCV RBC AUTO: 89.1 FL (ref 74–97)
MONOCYTES # BLD: 0.5 K/UL (ref 0.05–1.2)
MONOCYTES NFR BLD: 9 % (ref 3–10)
NEUTS SEG # BLD: 3.4 K/UL (ref 1.8–8)
NEUTS SEG NFR BLD: 60 % (ref 40–73)
PLATELET # BLD AUTO: 195 K/UL (ref 135–420)
PMV BLD AUTO: 11.4 FL (ref 9.2–11.8)
POTASSIUM SERPL-SCNC: 3.5 MMOL/L (ref 3.5–5.5)
PROTHROMBIN TIME: 17.2 SEC (ref 11.5–15.2)
RBC # BLD AUTO: 2.76 M/UL (ref 4.2–5.3)
SODIUM SERPL-SCNC: 142 MMOL/L (ref 136–145)
WBC # BLD AUTO: 5.7 K/UL (ref 4.6–13.2)

## 2020-07-20 PROCEDURE — 65660000000 HC RM CCU STEPDOWN

## 2020-07-20 PROCEDURE — 85025 COMPLETE CBC W/AUTO DIFF WBC: CPT

## 2020-07-20 PROCEDURE — 80048 BASIC METABOLIC PNL TOTAL CA: CPT

## 2020-07-20 PROCEDURE — 86923 COMPATIBILITY TEST ELECTRIC: CPT

## 2020-07-20 PROCEDURE — 88305 TISSUE EXAM BY PATHOLOGIST: CPT

## 2020-07-20 PROCEDURE — 36430 TRANSFUSION BLD/BLD COMPNT: CPT

## 2020-07-20 PROCEDURE — 0DB98ZX EXCISION OF DUODENUM, VIA NATURAL OR ARTIFICIAL OPENING ENDOSCOPIC, DIAGNOSTIC: ICD-10-PCS | Performed by: INTERNAL MEDICINE

## 2020-07-20 PROCEDURE — 0DBM8ZZ EXCISION OF DESCENDING COLON, VIA NATURAL OR ARTIFICIAL OPENING ENDOSCOPIC: ICD-10-PCS | Performed by: INTERNAL MEDICINE

## 2020-07-20 PROCEDURE — 76060000032 HC ANESTHESIA 0.5 TO 1 HR: Performed by: INTERNAL MEDICINE

## 2020-07-20 PROCEDURE — C9113 INJ PANTOPRAZOLE SODIUM, VIA: HCPCS | Performed by: INTERNAL MEDICINE

## 2020-07-20 PROCEDURE — 74011250636 HC RX REV CODE- 250/636: Performed by: NURSE ANESTHETIST, CERTIFIED REGISTERED

## 2020-07-20 PROCEDURE — 77030021593 HC FCPS BIOP ENDOSC BSC -A: Performed by: INTERNAL MEDICINE

## 2020-07-20 PROCEDURE — 74011000250 HC RX REV CODE- 250: Performed by: NURSE ANESTHETIST, CERTIFIED REGISTERED

## 2020-07-20 PROCEDURE — 85610 PROTHROMBIN TIME: CPT

## 2020-07-20 PROCEDURE — 74011000250 HC RX REV CODE- 250: Performed by: INTERNAL MEDICINE

## 2020-07-20 PROCEDURE — 36415 COLL VENOUS BLD VENIPUNCTURE: CPT

## 2020-07-20 PROCEDURE — 85018 HEMOGLOBIN: CPT

## 2020-07-20 PROCEDURE — P9016 RBC LEUKOCYTES REDUCED: HCPCS

## 2020-07-20 PROCEDURE — 77030037186 HC VLV ENDOSC STRL DEFENDO DISP MVAT -A: Performed by: INTERNAL MEDICINE

## 2020-07-20 PROCEDURE — 74011250637 HC RX REV CODE- 250/637: Performed by: INTERNAL MEDICINE

## 2020-07-20 PROCEDURE — 77030031670 HC DEV INFL ENDOTEK BIG60 MRTM -B: Performed by: INTERNAL MEDICINE

## 2020-07-20 PROCEDURE — 74011250636 HC RX REV CODE- 250/636: Performed by: INTERNAL MEDICINE

## 2020-07-20 PROCEDURE — 77030038604 HC SNR ENDO EXACTO USEN -B: Performed by: INTERNAL MEDICINE

## 2020-07-20 PROCEDURE — 76040000007: Performed by: INTERNAL MEDICINE

## 2020-07-20 PROCEDURE — 0DBL8ZZ EXCISION OF TRANSVERSE COLON, VIA NATURAL OR ARTIFICIAL OPENING ENDOSCOPIC: ICD-10-PCS | Performed by: INTERNAL MEDICINE

## 2020-07-20 PROCEDURE — 86900 BLOOD TYPING SEROLOGIC ABO: CPT

## 2020-07-20 RX ORDER — SODIUM CHLORIDE 9 MG/ML
INJECTION, SOLUTION INTRAVENOUS
Status: DISCONTINUED | OUTPATIENT
Start: 2020-07-20 | End: 2020-07-20 | Stop reason: HOSPADM

## 2020-07-20 RX ORDER — SODIUM CHLORIDE 9 MG/ML
250 INJECTION, SOLUTION INTRAVENOUS AS NEEDED
Status: DISCONTINUED | OUTPATIENT
Start: 2020-07-20 | End: 2020-07-21 | Stop reason: HOSPADM

## 2020-07-20 RX ORDER — FAMOTIDINE 10 MG/ML
20 INJECTION INTRAVENOUS
Status: DISCONTINUED | OUTPATIENT
Start: 2020-07-20 | End: 2020-07-21 | Stop reason: HOSPADM

## 2020-07-20 RX ORDER — PROPOFOL 10 MG/ML
INJECTION, EMULSION INTRAVENOUS AS NEEDED
Status: DISCONTINUED | OUTPATIENT
Start: 2020-07-20 | End: 2020-07-20 | Stop reason: HOSPADM

## 2020-07-20 RX ORDER — LIDOCAINE HYDROCHLORIDE 20 MG/ML
INJECTION, SOLUTION EPIDURAL; INFILTRATION; INTRACAUDAL; PERINEURAL AS NEEDED
Status: DISCONTINUED | OUTPATIENT
Start: 2020-07-20 | End: 2020-07-20 | Stop reason: HOSPADM

## 2020-07-20 RX ADMIN — PROPOFOL 20 MG: 10 INJECTION, EMULSION INTRAVENOUS at 13:15

## 2020-07-20 RX ADMIN — POLYETHYLENE GLYCOL 3350, SODIUM SULFATE, SODIUM CHLORIDE, POTASSIUM CHLORIDE, ASCORBIC ACID, SODIUM ASCORBATE 2 L: KIT at 05:19

## 2020-07-20 RX ADMIN — PROPOFOL 20 MG: 10 INJECTION, EMULSION INTRAVENOUS at 13:29

## 2020-07-20 RX ADMIN — PROPOFOL 20 MG: 10 INJECTION, EMULSION INTRAVENOUS at 13:23

## 2020-07-20 RX ADMIN — PROPOFOL 20 MG: 10 INJECTION, EMULSION INTRAVENOUS at 13:25

## 2020-07-20 RX ADMIN — SODIUM CHLORIDE 40 MG: 9 INJECTION, SOLUTION INTRAMUSCULAR; INTRAVENOUS; SUBCUTANEOUS at 10:55

## 2020-07-20 RX ADMIN — LIDOCAINE HYDROCHLORIDE 60 MG: 20 INJECTION, SOLUTION INTRAVENOUS at 13:10

## 2020-07-20 RX ADMIN — PROPOFOL 20 MG: 10 INJECTION, EMULSION INTRAVENOUS at 13:21

## 2020-07-20 RX ADMIN — Medication 10 ML: at 00:23

## 2020-07-20 RX ADMIN — PROPOFOL 100 MG: 10 INJECTION, EMULSION INTRAVENOUS at 13:10

## 2020-07-20 RX ADMIN — Medication 10 ML: at 21:10

## 2020-07-20 RX ADMIN — PROPOFOL 20 MG: 10 INJECTION, EMULSION INTRAVENOUS at 13:34

## 2020-07-20 RX ADMIN — PROPOFOL 20 MG: 10 INJECTION, EMULSION INTRAVENOUS at 13:27

## 2020-07-20 RX ADMIN — SODIUM CHLORIDE 40 MG: 9 INJECTION, SOLUTION INTRAMUSCULAR; INTRAVENOUS; SUBCUTANEOUS at 21:09

## 2020-07-20 RX ADMIN — METOPROLOL TARTRATE 25 MG: 25 TABLET, FILM COATED ORAL at 21:09

## 2020-07-20 RX ADMIN — METOPROLOL TARTRATE 25 MG: 25 TABLET, FILM COATED ORAL at 00:13

## 2020-07-20 RX ADMIN — SODIUM CHLORIDE: 900 INJECTION, SOLUTION INTRAVENOUS at 12:58

## 2020-07-20 RX ADMIN — PROPOFOL 20 MG: 10 INJECTION, EMULSION INTRAVENOUS at 13:19

## 2020-07-20 RX ADMIN — SODIUM CHLORIDE 40 MG: 9 INJECTION, SOLUTION INTRAMUSCULAR; INTRAVENOUS; SUBCUTANEOUS at 00:13

## 2020-07-20 RX ADMIN — PROPOFOL 20 MG: 10 INJECTION, EMULSION INTRAVENOUS at 13:31

## 2020-07-20 RX ADMIN — Medication 10 ML: at 14:00

## 2020-07-20 RX ADMIN — PROPOFOL 20 MG: 10 INJECTION, EMULSION INTRAVENOUS at 13:13

## 2020-07-20 RX ADMIN — PROPOFOL 20 MG: 10 INJECTION, EMULSION INTRAVENOUS at 13:17

## 2020-07-20 RX ADMIN — METOPROLOL TARTRATE 25 MG: 25 TABLET, FILM COATED ORAL at 10:55

## 2020-07-20 NOTE — PROGRESS NOTES
Problem: Falls - Risk of  Goal: *Absence of Falls  Description: Document Aubrie Dear Fall Risk and appropriate interventions in the flowsheet.   Outcome: Progressing Towards Goal  Note: Fall Risk Interventions:  Mobility Interventions: Patient to call before getting OOB         Medication Interventions: Patient to call before getting OOB, Teach patient to arise slowly    Elimination Interventions: Call light in reach              Problem: Patient Education: Go to Patient Education Activity  Goal: Patient/Family Education  Outcome: Progressing Towards Goal     Problem: Patient Education: Go to Patient Education Activity  Goal: Patient/Family Education  Outcome: Progressing Towards Goal     Problem: Upper and Lower GI Bleed: Day 1  Goal: *Demonstrates progressive activity  Outcome: Progressing Towards Goal     Problem: Upper and Lower GI Bleed: Day 2  Goal: Off Pathway (Use only if patient is Off Pathway)  Outcome: Progressing Towards Goal  Goal: Activity/Safety  Outcome: Progressing Towards Goal  Goal: Consults, if ordered  Outcome: Progressing Towards Goal  Goal: Diagnostic Test/Procedures  Outcome: Progressing Towards Goal  Goal: Nutrition/Diet  Outcome: Progressing Towards Goal  Goal: Discharge Planning  Outcome: Progressing Towards Goal  Goal: Medications  Outcome: Progressing Towards Goal  Goal: Respiratory  Outcome: Progressing Towards Goal  Goal: Treatments/Interventions/Procedures  Outcome: Progressing Towards Goal  Goal: Psychosocial  Outcome: Progressing Towards Goal  Goal: *Optimal pain control at patient's stated goal  Outcome: Progressing Towards Goal  Goal: *Hemodynamically stable  Outcome: Progressing Towards Goal  Goal: *Tolerating diet  Outcome: Progressing Towards Goal  Goal: *Demonstrates progressive activity  Outcome: Progressing Towards Goal     Problem: Upper and Lower GI Bleed: Day 3  Goal: Off Pathway (Use only if patient is Off Pathway)  Outcome: Progressing Towards Goal  Goal: Activity/Safety  Outcome: Progressing Towards Goal  Goal: Diagnostic Test/Procedures  Outcome: Progressing Towards Goal  Goal: Nutrition/Diet  Outcome: Progressing Towards Goal  Goal: Discharge Planning  Outcome: Progressing Towards Goal  Goal: Medications  Outcome: Progressing Towards Goal  Goal: Treatments/Interventions/Procedures  Outcome: Progressing Towards Goal  Goal: Psychosocial  Outcome: Progressing Towards Goal     Problem: Upper and Lower GI Bleed:  Discharge Outcomes  Goal: *Hemodynamically stable  Outcome: Progressing Towards Goal  Goal: *Lungs clear or at baseline  Outcome: Progressing Towards Goal  Goal: *Demonstrates independent activity or return to baseline  Outcome: Progressing Towards Goal  Goal: *Pain is controlled to three or less  Outcome: Progressing Towards Goal  Goal: *Verbalizes understanding and describes prescribed diet  Outcome: Progressing Towards Goal  Goal: *Tolerating diet  Outcome: Progressing Towards Goal  Goal: *Verbalizes name, dosage, time, side effects, and number of days to continue medications  Outcome: Progressing Towards Goal  Goal: *Anxiety reduced or absent  Outcome: Progressing Towards Goal  Goal: *Understands and describes signs and symptoms to report to providers(Stroke Metric)  Outcome: Progressing Towards Goal  Goal: *Describes follow-up/return visits to physicians  Outcome: Progressing Towards Goal  Goal: *Describes available resources and support systems  Outcome: Progressing Towards Goal     Problem: Nutrition Deficit  Goal: *Optimize nutritional status  Outcome: Progressing Towards Goal

## 2020-07-20 NOTE — PROGRESS NOTES
Problem: Falls - Risk of  Goal: *Absence of Falls  Description: Document Susan Bridges Fall Risk and appropriate interventions in the flowsheet.   Outcome: Progressing Towards Goal  Note: Fall Risk Interventions:  Mobility Interventions: Patient to call before getting OOB         Medication Interventions: Patient to call before getting OOB    Elimination Interventions: Call light in reach              Problem: Patient Education: Go to Patient Education Activity  Goal: Patient/Family Education  Outcome: Progressing Towards Goal     Problem: Patient Education: Go to Patient Education Activity  Goal: Patient/Family Education  Outcome: Progressing Towards Goal     Problem: Upper and Lower GI Bleed: Day 1  Goal: Off Pathway (Use only if patient is Off Pathway)  Outcome: Progressing Towards Goal  Goal: Activity/Safety  Outcome: Progressing Towards Goal  Goal: Consults, if ordered  Outcome: Progressing Towards Goal  Goal: Diagnostic Test/Procedures  Outcome: Progressing Towards Goal  Goal: Nutrition/Diet  Outcome: Progressing Towards Goal  Goal: Discharge Planning  Outcome: Progressing Towards Goal  Goal: Medications  Outcome: Progressing Towards Goal  Goal: Respiratory  Outcome: Progressing Towards Goal  Goal: Treatments/Interventions/Procedures  Outcome: Progressing Towards Goal  Goal: Psychosocial  Outcome: Progressing Towards Goal  Goal: *Optimal pain control at patient's stated goal  Outcome: Progressing Towards Goal  Goal: *Hemodynamically stable  Outcome: Progressing Towards Goal  Goal: *Demonstrates progressive activity  Outcome: Progressing Towards Goal     Problem: Upper and Lower GI Bleed: Day 2  Goal: Off Pathway (Use only if patient is Off Pathway)  Outcome: Progressing Towards Goal  Goal: Activity/Safety  Outcome: Progressing Towards Goal  Goal: Consults, if ordered  Outcome: Progressing Towards Goal  Goal: Diagnostic Test/Procedures  Outcome: Progressing Towards Goal  Goal: Nutrition/Diet  Outcome: Progressing Towards Goal  Goal: Discharge Planning  Outcome: Progressing Towards Goal  Goal: Medications  Outcome: Progressing Towards Goal  Goal: Respiratory  Outcome: Progressing Towards Goal  Goal: Treatments/Interventions/Procedures  Outcome: Progressing Towards Goal  Goal: Psychosocial  Outcome: Progressing Towards Goal  Goal: *Optimal pain control at patient's stated goal  Outcome: Progressing Towards Goal  Goal: *Hemodynamically stable  Outcome: Progressing Towards Goal  Goal: *Tolerating diet  Outcome: Progressing Towards Goal  Goal: *Demonstrates progressive activity  Outcome: Progressing Towards Goal     Problem: Upper and Lower GI Bleed: Day 3  Goal: Off Pathway (Use only if patient is Off Pathway)  Outcome: Progressing Towards Goal  Goal: Activity/Safety  Outcome: Progressing Towards Goal  Goal: Diagnostic Test/Procedures  Outcome: Progressing Towards Goal  Goal: Nutrition/Diet  Outcome: Progressing Towards Goal  Goal: Discharge Planning  Outcome: Progressing Towards Goal  Goal: Medications  Outcome: Progressing Towards Goal  Goal: Treatments/Interventions/Procedures  Outcome: Progressing Towards Goal  Goal: Psychosocial  Outcome: Progressing Towards Goal     Problem: Upper and Lower GI Bleed:  Discharge Outcomes  Goal: *Hemodynamically stable  Outcome: Progressing Towards Goal  Goal: *Lungs clear or at baseline  Outcome: Progressing Towards Goal  Goal: *Demonstrates independent activity or return to baseline  Outcome: Progressing Towards Goal  Goal: *Pain is controlled to three or less  Outcome: Progressing Towards Goal  Goal: *Verbalizes understanding and describes prescribed diet  Outcome: Progressing Towards Goal  Goal: *Tolerating diet  Outcome: Progressing Towards Goal  Goal: *Verbalizes name, dosage, time, side effects, and number of days to continue medications  Outcome: Progressing Towards Goal  Goal: *Anxiety reduced or absent  Outcome: Progressing Towards Goal  Goal: *Understands and describes signs and symptoms to report to providers(Stroke Metric)  Outcome: Progressing Towards Goal  Goal: *Describes follow-up/return visits to physicians  Outcome: Progressing Towards Goal  Goal: *Describes available resources and support systems  Outcome: Progressing Towards Goal     Problem: Pain  Goal: *Control of Pain  Outcome: Progressing Towards Goal     Problem: Patient Education: Go to Patient Education Activity  Goal: Patient/Family Education  Outcome: Progressing Towards Goal     Problem: Nutrition Deficit  Goal: *Optimize nutritional status  Outcome: Progressing Towards Goal     Problem: Pressure Injury - Risk of  Goal: *Prevention of pressure injury  Description: Document Ryan Scale and appropriate interventions in the flowsheet. Outcome: Progressing Towards Goal  Note: Pressure Injury Interventions:             Activity Interventions: Pressure redistribution bed/mattress(bed type)    Mobility Interventions: HOB 30 degrees or less    Nutrition Interventions: Document food/fluid/supplement intake                     Problem: Patient Education: Go to Patient Education Activity  Goal: Patient/Family Education  Outcome: Progressing Towards Goal

## 2020-07-20 NOTE — ANESTHESIA POSTPROCEDURE EVALUATION
Procedure(s):  ESOPHAGOGASTRODUODENOSCOPY (EGD) with bx  COLONOSCOPY with bx and cold snare polypectomy. MAC    Anesthesia Post Evaluation      Multimodal analgesia: multimodal analgesia used between 6 hours prior to anesthesia start to PACU discharge  Patient location during evaluation: bedside  Patient participation: complete - patient participated  Level of consciousness: awake  Pain management: adequate  Airway patency: patent  Anesthetic complications: no  Cardiovascular status: stable  Respiratory status: acceptable  Hydration status: acceptable  Post anesthesia nausea and vomiting:  controlled      INITIAL Post-op Vital signs: No vitals data found for the desired time range.

## 2020-07-20 NOTE — PROGRESS NOTES
Problem: Falls - Risk of  Goal: *Absence of Falls  Description: Document Susan Bridges Fall Risk and appropriate interventions in the flowsheet. Outcome: Progressing Towards Goal  Note: Fall Risk Interventions:  Mobility Interventions: Patient to call before getting OOB, Communicate number of staff needed for ambulation/transfer, Bed/chair exit alarm         Medication Interventions: Teach patient to arise slowly, Patient to call before getting OOB, Evaluate medications/consider consulting pharmacy, Bed/chair exit alarm    Elimination Interventions: Toileting schedule/hourly rounds, Toilet paper/wipes in reach, Stay With Me (per policy), Patient to call for help with toileting needs, Call light in reach, Bed/chair exit alarm              Problem: Patient Education: Go to Patient Education Activity  Goal: Patient/Family Education  Outcome: Progressing Towards Goal     Problem: Pressure Injury - Risk of  Goal: *Prevention of pressure injury  Description: Document Ryan Scale and appropriate interventions in the flowsheet. Outcome: Progressing Towards Goal  Note: Pressure Injury Interventions:             Activity Interventions: Pressure redistribution bed/mattress(bed type)    Mobility Interventions: HOB 30 degrees or less, Pressure redistribution bed/mattress (bed type)    Nutrition Interventions: Document food/fluid/supplement intake    Friction and Shear Interventions: Minimize layers, HOB 30 degrees or less                Problem: Patient Education: Go to Patient Education Activity  Goal: Patient/Family Education  Outcome: Progressing Towards Goal

## 2020-07-20 NOTE — PROGRESS NOTES
conducted an initial consultation and Spiritual Assessment for Beatriz Hercules, who is a 80 y.o.,female. Patients Primary Language is: [place]. According to the patients EMR [employer] Affiliation is: Religious. The reason the Patient came to the hospital is:   Patient Active Problem List    Diagnosis Date Noted    GI bleed 07/17/2020    Coagulopathy ([facility] [address] 07/17/2020    Acute blood loss anemia 07/17/2020    Cardiomyopathy ([facility] [address] 02/19/2015    Mitral regurgitation 02/19/2015    Acute pulmonary edema (HCC) 02/18/2015    Tachycardia 02/18/2015    HTN (hypertension) 02/18/2015    CHF (congestive heart failure) ([facility] 02/18/2015        The  provided the following Interventions:  Initiated a relationship of care and support. Explored issues of chris, spirituality and/or Anabaptist needs while hospitalized. Listened empathically. Provided chaplaincy education. Provided information about Spiritual Care Services. Offered prayer and assurance of continued prayers on patient's behalf. Chart reviewed. The following outcomes were achieved:  Patient shared some information about their medical narrative and spiritual journey/beliefs. Patient processed feeling about current hospitalization. Patient expressed gratitude for the 's visit. Assessment:  Patient did not indicate any spiritual or Anabaptist issues which require Spiritual Care Services interventions at this time. Patient does not have any Anabaptist/cultural needs that will affect patients preferences in health care. Plan:  Chaplains will continue to follow and will provide pastoral care on an as needed or requested basis.  recommends bedside caregivers page  on duty if patient shows signs of acute spiritual or emotional distress.     [address] [facility]   Staff [address] [facility]   [phone]

## 2020-07-20 NOTE — PROCEDURES
Colonoscopy Report    Patient: Amber Cooper MRN: [id number]  SSN: [id number]    YOB: 1935  Age: 80 y.o. Sex: female      Date of Procedure: 7/20/2020    IMPRESSION:  1. Severe pan diverticulosis. 2. Blood in the colon. Unable to localize source. Likely diverticular in origin  3. Transverse colon polyps x 3, 8-9 mm, flat, status cold forceps and post cold snare removed  4. Descending colon polyps x 2, 7-8 mm, flat, status post cold snare removed  5. Hemorrhoids    RECOMMENDATIONS:  1. Resume regular diet, Recommend high fiber. 2. Will contact with polyp results in 2 weeks. These results will determine timing to next screening. Patient will be instructed to contact our office if they have not received the results by three weeks. Indication:  Gastrointestinal Bleeding  Procedure Performed: Colonoscopy polypectomy (cold snare), polypectomy (cold biopsy)  Endoscopist: Sharion Mortimer, MD  Assistant: Endoscopy Technician-1: Cat Cook RN-1: Tong Chicas RN  ASA: ASA 3 - Patient with moderate systemic disease with functional limitations  Mallampati Score: II (soft palate, uvula, fauces visible)  Anesthesia: MAC anesthesia Propofol  Endoscope:     [x]  CF H 190AL   []  PCF H190AL   []  GIF H 190    Extent of Examination:cecum, which was identified by the ileocecal valve and appendiceal orifice  Quality of Preparation:     []  Excellent   []  Very Good   [x] Fair but adequate   [] Fair, inadequate   []  Poor      Technique: The procedure was discussed with the patient including risks, benefits, alternatives including risks of IV sedation, bleeding, perforation and missed polyp. A safety timeout was performed. The patient was given incremental doses of intravenous sedation to achieve moderate sedation. The patients vital signs were monitored at all times including heart rate, rhythm, blood pressure and oxygen saturation. The patient was placed in left lateral position. When adequate sedation was achieved a perianal inspection and a digital rectal exam were performed. Video colonoscope was introduced into the rectum and advanced under direct vision up to the cecum, which was identified by the ileocecal valve and appendiceal orifice. The cecum was identified by IC valve, appendiceal orifice and crows foot. With adequate insufflation and maneuvering of the withdrawing scope, the colonic mucosa was visualized carefully. Retroflexion was performed in the rectum and the distal rectum visualized. The patient tolerated the procedure very well and was transferred to recovery area. Findings:  1. Severe pan diverticulosis. 2. Blood in the colon. Unable to localize source. Likely diverticular in origin  3. Transverse colon polyps x 3, 8-9 mm, flat, status cold forceps and post cold snare removed  4. Descending colon polyps x 2, 7-8 mm, flat, status post cold snare removed  5.  Hemorrhoids      EBL:Minimal  Specimen:   ID Type Source Tests Collected by Time Destination   1 : Bx ulcer Preservative Duodenum  Alicia Onofre MD 7/20/2020 1315 Pathology   2 : Bx  and cold snare-polyps x3 Preservative Colon, Transverse  Alicia Onofre MD 7/20/2020 1326 Pathology   3 : cold snare-polyps x2 Preservative Colon, Descending  Alicia Onofre MD 7/20/2020 1330 Pathology       Erma Ghosh MD  July 20, 2020  1:39 PM

## 2020-07-20 NOTE — PROGRESS NOTES
1900  -- Bedside, Verbal and Written shift change report given to [facility] (oncoming nurse) by  (offgoing nurse). Allergy band placed on pt's wrist. Report included the following information SBAR, Kardex, Intake/Output, MAR and Recent Results.       2107 -- PM medications administered, pt tolerated with ease, will continue to monitor. 2200 -- Critical lab (see flowsheet) MD paged, new orders placed. 2250 -- Blood administration started. 2300 -- New blood band placed, older band removed.      0045 -- Shift reassessment, pt condition unchanged, will continue to monitor. 0115 -- Blood administration completed. 0145 -- 2 of 3 RBC's started. 0345 -- Blood administration completed.      3424 --  Shift reassessment, pt condition unchanged, will continue to monitor.      0430 -- 3 of 3 RBC's started.      0700  -- Bedside, Verbal and Written shift change report given to [facility]) by ROLLY (offgoing nurse). Report included the following information SBAR, Kardex, Intake/Output, MAR and Recent Results. Skin assessment completed.

## 2020-07-20 NOTE — PROGRESS NOTES
PT orders received and chart reviewed. Active bedrest orders. Will need updated activity orders prior to PT evaluation to maximize safety and patient participation in fucntional mobility and assist in discharge recommendations.

## 2020-07-20 NOTE — PROGRESS NOTES
Discharge/Transition Planning  Problem: Discharge Planning  Goal: *Discharge to safe environment  Outcome: Resolved/Met       Home    Spoke with pt and plan is still to return home at discharge and granddaughter will pick her up    Vinny Sterling RN BSN  Outcomes Manager  Office # [phone]  Pager # [phone]

## 2020-07-20 NOTE — PROGRESS NOTES
--Bedside and Verbal shift change report given to ABHISHEK MCNEIL (oncoming nurse) by Angie Luke (offgoing nurse). Report included the following information SBAR, Kardex, MAR and Recent Results. 0930 took patient vs. medications administered, pt tolerated with ease, will continue to monitor. Ambulate to bathroom with walker  And assistive with morning care, linens changed and patient gave herself a bath  With soap and water in the basin. -- Shift reassessment, pt condition unchanged, will continue to monitor. -- Shift reassessment, pt condition unchanged, will continue to monitor. -- Bedside, Verbal and Written shift change report given to +++(oncoming nurse) by Samm Mixon (offgoing nurse). Report included the following information SBAR, Kardex, MAR and Recent Results.

## 2020-07-20 NOTE — CONSULTS
[facility] - Consult Note    Consultation request by Danielle De León MD for advice/opinion related to evaluating GI bleed [K92.2]    Date of  Admission: 7/17/2020  3:31 PM   Primary Care Physician:  Scottie Diaz MD     Subjective:  Denies any chest pain or shortness of breath. Denies any active bleeding. Assessment:     -Acute blood loss anemia, Hgb 5.8 at presentation, transfusing, worsened due to xarelto  -EKG with sinus tachycardia, RBBB  -LVEF 50-55% by echo in July 2020  -h/o paroxysmal A.fib on lopressor/xarelto  -Indeterminate troponin, not consistent with ACS  -Chronic diastolic heart failure, EF 50% by echo 7/18/20, no change from 2015  -h/o HTN  -h/o noncompliance, last seen PMD 3 years ago    Last seen by Dr. Kerrie Ley in 2015     Plan:     Severe anemia is major concern, worsened due to Xarelto for A.fib history. Xarelto held, transfusing. Plan for EGD and colonoscopy noted. Continue low-dose beta-blocker at this time. Rate control. History of Present Illness: This is a 80 y.o. female admitted for GI bleed [K92.2]. Patient complains of:    Presented to ER yesterday with bloody stools while taking NOAC. No chest pain, dyspnea, syncope. Mild dizziness when standing. Baseline poor historian.       Review of Symptoms:  Except as stated above include:  Constitutional:  Dizzy when standing  Ears, nose, throat:  Negative  Respiratory:  negative  Cardiovascular:  negative  Gastrointestinal: bloody stools  Genitourinary:  negative  Musculoskeletal:  Negative  Neurological:  Negative  Dermatological:  Negative  Hematological: Negative  Endocrinological: Negative  Allergy: Negative  Psychological:  Negative     Past Medical History:     Past Medical History:   Diagnosis Date    Atrial flutter ([address] 02/2015    Cardiomyopathy ([facility] [address]     EF 30% (2/15)    Hypertension     Mitral regurgitation          Social History:     Social History     Socioeconomic History    Marital status:      Spouse name: Not on file    Number of children: Not on file    Years of education: Not on file    Highest education level: Not on file   Tobacco Use    Smoking status: Never Smoker    Smokeless tobacco: Never Used   Substance and Sexual Activity    Alcohol use: No    Drug use: No        Family History:   History reviewed. No pertinent family history.      Medications:   No Known Allergies     Current Facility-Administered Medications   Medication Dose Route Frequency    0.9% sodium chloride infusion 250 mL  250 mL IntraVENous PRN    naloxone (NARCAN) injection 0.4 mg  0.4 mg IntraVENous PRN    0.9% sodium chloride infusion 250 mL  250 mL IntraVENous PRN    0.9% sodium chloride infusion 250 mL  250 mL IntraVENous PRN    0.9% sodium chloride infusion 250 mL  250 mL IntraVENous PRN    metoprolol tartrate (LOPRESSOR) tablet 25 mg  25 mg Oral Q12H    traMADoL (ULTRAM) tablet 50 mg  50 mg Oral Q6H PRN    sodium chloride (NS) flush 5-40 mL  5-40 mL IntraVENous Q8H    sodium chloride (NS) flush 5-40 mL  5-40 mL IntraVENous PRN    pantoprazole (PROTONIX) 40 mg in 0.9% sodium chloride 10 mL injection  40 mg IntraVENous Q12H    dextrose 5% - 0.45% NaCl with KCl 10 mEq/L infusion  50 mL/hr IntraVENous CONTINUOUS         Physical Exam:     Visit Vitals  /66   Pulse 82   Temp 98 °F (36.7 °C)   Resp 16   Ht 5' 4\" (1.626 m)   Wt 161 lb 3.2 oz (73.1 kg)   SpO2 99%   BMI 27.67 kg/m²     BP Readings from Last 3 Encounters:   07/20/20 145/66   06/18/15 124/65   03/17/15 128/76     Pulse Readings from Last 3 Encounters:   07/20/20 82   06/18/15 63   03/17/15 62     Wt Readings from Last 3 Encounters:   07/19/20 161 lb 3.2 oz (73.1 kg)   06/18/15 163 lb (73.9 kg)   03/17/15 169 lb (76.7 kg)       General:  alert, cooperative, no distress, appears stated age  Neck:  nontender  Lungs:  clear to auscultation bilaterally  Heart:  tachy, S1, S2 normal, no murmur, click, rub or gallop  Abdomen:  abdomen is soft without significant tenderness, masses, organomegaly or guarding  Extremities:  extremities normal, atraumatic, no cyanosis or edema  Skin: Warm and dry. no hyperpigmentation, vitiligo, or suspicious lesions  Neuro: alert, oriented x3, affect appropriate, no focal neurological deficits, moves all extremities well, no involuntary movements, reflexes at knee and ankle intact  Psych: non focal     Data Review:     Recent Labs     07/20/20  0400 07/19/20  0830 07/19/20  0054  07/18/20  0340   WBC 5.7  --  6.2  --  6.6   HGB 8.0* 8.5* 6.9*   < > 5.8*   HCT 24.6* 25.7* 21.4*   < > 18.2*     --  184  --  166    < > = values in this interval not displayed.      Recent Labs     07/20/20  0400 07/19/20  0054 07/18/20  0340 07/17/20  1610    140 143 142   K 3.5 3.6 4.3 3.8    111 115* 113*   CO2 24 26 25 24   GLU 99 93 110* 146*   BUN 10 11 13 14   CREA 1.00 1.10 0.96 1.16   CA 8.3* 8.1* 7.8* 8.3*   MG  --   --  2.3  --    PHOS  --   --  3.0  --    ALB  --   --  2.8* 3.3*   ALT  --   --  10* 14   INR 1.4* 1.7* 1.9* 2.7*       Results for orders placed or performed during the hospital encounter of 07/17/20   EKG, 12 LEAD, INITIAL   Result Value Ref Range    Ventricular Rate 113 BPM    Atrial Rate 113 BPM    P-R Interval 176 ms    QRS Duration 138 ms    Q-T Interval 432 ms    QTC Calculation (Bezet) 592 ms    Calculated R Axis -34 degrees    Calculated T Axis 16 degrees    Diagnosis       Sinus tachycardia with occasional premature ventricular complexes  Left axis deviation  Right bundle branch block  Minimal voltage criteria for LVH, may be normal variant ( R in aVL )  Abnormal ECG  Confirmed by Kody Saenz MD, Dandre Marcus ([id number]) on 7/18/2020 10:26:05 AM         All Cardiac Markers in the last 24 hours:    No results found for: CPK, CK, CKMMB, CKMB, RCK3, CKMBT, CKNDX, CKND1, RAVI, TROPT, TROIQ, DENG, TROPT, TNIPOC, BNP, BNPP    Last Lipid:    Lab Results   Component Value Date/Time Cholesterol, total 127 02/18/2015 08:40 PM    HDL Cholesterol 40 02/18/2015 08:40 PM    LDL, calculated 67.2 02/18/2015 08:40 PM    Triglyceride 99 02/18/2015 08:40 PM    CHOL/HDL Ratio 3.2 02/18/2015 08:40 PM       Signed By: Saskia Narayanan MD     July 20, 2020

## 2020-07-20 NOTE — ANESTHESIA PREPROCEDURE EVALUATION
Relevant Problems   No relevant active problems       Anesthetic History   No history of anesthetic complications            Review of Systems / Medical History  Patient summary reviewed and pertinent labs reviewed    Pulmonary  Within defined limits                 Neuro/Psych   Within defined limits           Cardiovascular    Hypertension        Dysrhythmias : atrial flutter           GI/Hepatic/Renal                Endo/Other        Anemia     Other Findings              Physical Exam    Airway  Mallampati: III  TM Distance: 4 - 6 cm  Neck ROM: decreased range of motion   Mouth opening: Diminished (comment)     Cardiovascular    Rhythm: regular  Rate: normal         Dental    Dentition: Poor dentition     Pulmonary  Breath sounds clear to auscultation               Abdominal  GI exam deferred       Other Findings            Anesthetic Plan    ASA: 3  Anesthesia type: MAC            Anesthetic plan and risks discussed with: Patient

## 2020-07-20 NOTE — PERIOP NOTES
Patient arrived to Phase 2. Vitals signs monitoring initiated. Report  received from (........ )    Patient oriented to Person , Place, Time, Situation. Patient denies complaints of nausea and dizziness. *Patient's family member(....... ) brought back to pod. Patient tolerated PO fluids. Patient ambulated from stretcher to chair with minimal assistance. IV removed. Patient dressed with home clothes. Reviewed discharge instructions. *Point of contact  given verbal instructions via phone due to COVID procedures. Patient transported to vehicle via wheel chair.

## 2020-07-20 NOTE — PROGRESS NOTES
7/19/2020 19:20- Report received from previous nurse, Lia Rubio RN. Patient had completed the prep as ordered for colonoscopy tomorrow. Patient has no c/o at this time. Call light in reach. 20:03- assessment completed- see flow sheet. IV in the left ac is leaking- IV fluid stopped at this time. Will assess and attempt new IV insert. Patient has no c/o pain/discomfort. Call light in reach. 22:30- UNsuccessful IV insert . Informed patient that another nurse will attempt to insert a new IV.  23:15- Asked ABHISHEK Lyon to insert an IV. Karl Pinto RN agreed and will see patient as soon as she can for IV insert. 00:10- This writer and Karl Pinto RN entered patient's room. Explained that Karl Pinto will attempt to insert a new IV.   00:15- 22G inserted in Right ac by Karl Pinto RN. Ordered IV fluids restarted. Call light in reach.    07:25-Report given to oncoming nurse, ABHISHEK Jarrett

## 2020-07-20 NOTE — PROGRESS NOTES
Bedside shift change report given to Marino Omer (oncoming nurse) by Lia Rubio (offgoing nurse). Report included the following information SBAR, Kardex, Procedure Summary and Cardiac Rhythm SR with BBB.

## 2020-07-20 NOTE — PROCEDURES
Endoscopy Procedure Note    Patient: Lay Spain MRN: [id number]  SSN: [id number]    YOB: 1935  Age: 80 y.o. Sex: female      Date/Time:  7/20/2020 1:36 PM    Esophagogastroduodenoscopy (EGD) Procedure Note    Procedure: Esophagogastroduodenoscopy with biopsy    IMPRESSION:   1. Normal esophagus  2. Small hiatal hernia  3. Normal stomach  4. Duodenal ulcer with scarring. No active bleeding. No visible vessel. 5. Normal second portion of duodenum    RECOMMENDATIONS:  1. Resume regular diet. 2. Monitor H&H.   3. Pantoprazole once daily  4. Will follow up pathology    Indication: Gastrointestinal bleeding  :  Saida Koo MD  Assistants: Endoscopy Technician-1: Camilla Angelucci  Endoscopy RN-1: Allan Ludwig RN    Referring Provider:   Concha Rhodes MD  History: The history and physical exam were reviewed and updated. Endoscope: Olympus [id number]-190 diagnostic endoscope  Extent of Exam: second portion of the duodenum  ASA: ASA 3 - Patient with moderate systemic disease with functional limitations  Anethesia/Sedation:  MAC anesthesia Propofol    Description of the procedure: The procedure was discussed with the patient including risks, benefits, alternatives including risks of iv sedation, bleeding, perforation and aspiration. A safety timeout was performed. The patient was placed in the left lateral decubitus position. A bite block was placed. The patient was given incremental doses of intravenous sedation until moderate sedation was achieved. The patients vital signs were monitored at all times including heart rate/rhythm, blood pressure and oxygen saturation. The endoscope was then passed under direct visualization to the second portion of the duodenum. The endoscope was then slowly withdrawn while visualizing the mucosa. In the stomach a retroflexion was performed and gastric fundus and cardia visualized. The endoscope was then slowly withdrawn.   The patient was then transferred to recovery in stable condition. Findings:   1. Normal esophagus  2. Small hiatal hernia  3. Normal stomach  4. Duodenal ulcer with scarring. No active bleeding. No visible vessel. 5. Normal second portion of duodenum    Specimens:   ID Type Source Tests Collected by Time Destination   1 : Bx ulcer Preservative Duodenum  Eagle Schuster MD 7/20/2020 1315 Pathology   2 : Bx  and cold snare-polyps x3 Preservative Colon, Transverse  Eagle Schuster MD 7/20/2020 1326 Pathology   3 : cold snare-polyps x2 Preservative Colon, Descending  Eagle Schuster MD 7/20/2020 1330 Pathology               Complications:   None; patient tolerated the procedure well.     EBL:Minimal    Discharge disposition:  Home in the company of  when able to ambulate    Thomes Halsted, MD  July 20, 2020  1:36 PM

## 2020-07-20 NOTE — ROUTINE PROCESS
TRANSFER - OUT REPORT:    Verbal report given to [facility] (name) on Mignon Sotelo  being transferred to 2200(unit) for routine progression of care       Report consisted of patients Situation, Background, Assessment and   Recommendations(SBAR). Information from the following report(s) Procedure Summary, MAR and Recent Results was reviewed with the receiving nurse. Lines:   Peripheral IV 07/20/20 Right Antecubital (Active)   Site Assessment Clean, dry, & intact 07/20/20 1343   Phlebitis Assessment 0 07/20/20 1343   Infiltration Assessment 0 07/20/20 1343   Dressing Status Clean, dry, & intact 07/20/20 1343   Dressing Type Tape;Transparent 07/20/20 1343   Hub Color/Line Status Blue; Infusing 07/20/20 1343   Action Taken Open ports on tubing capped 07/20/20 1343   Alcohol Cap Used Yes 07/20/20 1343        Opportunity for questions and clarification was provided.       Patient transported with:   [employer]

## 2020-07-20 NOTE — PROGRESS NOTES
Internal Medicine Progress Note    Patient's Name: Ferny Cotton  Admit Date: 7/17/2020  Length of Stay: 3      Assessment/Plan     Active Hospital Problems    Diagnosis Date Noted    GI bleed 07/17/2020    Coagulopathy ([address] 07/17/2020    Acute blood loss anemia 07/17/2020    HTN (hypertension) 02/18/2015    CHF (congestive heart failure) (HCC) 02/18/2015   Bleeding (Hemorrhagic disorder) associated with  Xarelto     - Hgb stable  - EGD/colonoscopy today  - Likely observe overnight regardless  - Trend CBC  - D/w GI when to restart xarelto if able  - Cont acceptable home medications for chronic conditions   - DVT protocol    I have personally reviewed all pertinent labs and films that have officially resulted over the last 24 hours. I have personally checked for all pending labs that are awaiting final results.     Subjective     Pt s/e @ bedside  No major events overnight  Pt offers no complaints this AM  Denies CP or SOB    Objective     Visit Vitals  BP 93/47   Pulse 81   Temp 97.9 °F (36.6 °C)   Resp 17   Ht 5' 4\" (1.626 m)   Wt 73.2 kg (161 lb 4.6 oz)   SpO2 96%   BMI 27.69 kg/m²       Physical Exam:  General Appearance: NAD, conversant  Lungs: CTA with normal respiratory effort  CV: RRR, no m/r/g  Abdomen: soft, non-tender, normal bowel sounds  Extremities: no cyanosis, no peripheral edema  Neuro: No focal deficits, motor/sensory intact    Lab/Data Reviewed:  BMP:   Lab Results   Component Value Date/Time     07/20/2020 04:00 AM    K 3.5 07/20/2020 04:00 AM     07/20/2020 04:00 AM    CO2 24 07/20/2020 04:00 AM    AGAP 7 07/20/2020 04:00 AM    GLU 99 07/20/2020 04:00 AM    BUN 10 07/20/2020 04:00 AM    CREA 1.00 07/20/2020 04:00 AM    GFRAA >60 07/20/2020 04:00 AM    GFRNA 53 (L) 07/20/2020 04:00 AM     CBC:   Lab Results   Component Value Date/Time    WBC 5.7 07/20/2020 04:00 AM    HGB 8.2 (L) 07/20/2020 08:30 AM    HCT 25.0 (L) 07/20/2020 08:30 AM     07/20/2020 04:00 AM Imaging Reviewed:  No results found.     Medications Reviewed:  Current Facility-Administered Medications   Medication Dose Route Frequency    0.9% sodium chloride infusion 250 mL  250 mL IntraVENous PRN    naloxone (NARCAN) injection 0.4 mg  0.4 mg IntraVENous PRN    0.9% sodium chloride infusion 250 mL  250 mL IntraVENous PRN    0.9% sodium chloride infusion 250 mL  250 mL IntraVENous PRN    0.9% sodium chloride infusion 250 mL  250 mL IntraVENous PRN    metoprolol tartrate (LOPRESSOR) tablet 25 mg  25 mg Oral Q12H    traMADoL (ULTRAM) tablet 50 mg  50 mg Oral Q6H PRN    sodium chloride (NS) flush 5-40 mL  5-40 mL IntraVENous Q8H    sodium chloride (NS) flush 5-40 mL  5-40 mL IntraVENous PRN    pantoprazole (PROTONIX) 40 mg in 0.9% sodium chloride 10 mL injection  40 mg IntraVENous Q12H    dextrose 5% - 0.45% NaCl with KCl 10 mEq/L infusion  50 mL/hr IntraVENous CONTINUOUS           Augustina Curling, DO  Internal Medicine, Hospitalist  Pager: [phone]

## 2020-07-20 NOTE — CDMP QUERY
Patient admitted with Melena,  and is on Chronic Anti-coagulation.    If possible, please document in the progress notes and discharge summary if you are evaluating and/or treating any of the following:     Bleeding (Hemorrhagic disorder) associated with  Xarelto   Bleeding unrelated to anti-coagulation   Other, please specify   Undetermined    The medical record reflects the following:  Risk Factors: Pt on Xarelto for Aflutter,   Clinical Indicators: H/H POA 6.4, 20.0.  + Occult blood in stool,   Treatment: Transfusion of blood, vit K, Gi consult,     Thank you,  Delfina Luciano RN/CDI  [phone]

## 2020-07-21 VITALS
BODY MASS INDEX: 27.54 KG/M2 | SYSTOLIC BLOOD PRESSURE: 147 MMHG | OXYGEN SATURATION: 100 % | WEIGHT: 161.29 LBS | HEART RATE: 77 BPM | DIASTOLIC BLOOD PRESSURE: 76 MMHG | HEIGHT: 64 IN | RESPIRATION RATE: 16 BRPM | TEMPERATURE: 98.3 F

## 2020-07-21 LAB
ABO + RH BLD: NORMAL
BLD PROD TYP BPU: NORMAL
BLOOD GROUP ANTIBODIES SERPL: NORMAL
BPU ID: NORMAL
CALLED TO:,BCALL1: NORMAL
CALLED TO:,BCALL2: NORMAL
CALLED TO:,BCALL3: NORMAL
CROSSMATCH RESULT,%XM: NORMAL
HCT VFR BLD AUTO: 33.1 % (ref 35–45)
HGB BLD-MCNC: 11.1 G/DL (ref 12–16)
INR PPP: 1.2 (ref 0.8–1.2)
PROTHROMBIN TIME: 14.6 SEC (ref 11.5–15.2)
SPECIMEN EXP DATE BLD: NORMAL
STATUS OF UNIT,%ST: NORMAL
UNIT DIVISION, %UDIV: 0

## 2020-07-21 PROCEDURE — 74011000250 HC RX REV CODE- 250: Performed by: INTERNAL MEDICINE

## 2020-07-21 PROCEDURE — 97162 PT EVAL MOD COMPLEX 30 MIN: CPT

## 2020-07-21 PROCEDURE — 97116 GAIT TRAINING THERAPY: CPT

## 2020-07-21 PROCEDURE — C9113 INJ PANTOPRAZOLE SODIUM, VIA: HCPCS | Performed by: INTERNAL MEDICINE

## 2020-07-21 PROCEDURE — P9016 RBC LEUKOCYTES REDUCED: HCPCS

## 2020-07-21 PROCEDURE — 85018 HEMOGLOBIN: CPT

## 2020-07-21 PROCEDURE — 85610 PROTHROMBIN TIME: CPT

## 2020-07-21 PROCEDURE — 36415 COLL VENOUS BLD VENIPUNCTURE: CPT

## 2020-07-21 PROCEDURE — 74011250637 HC RX REV CODE- 250/637: Performed by: INTERNAL MEDICINE

## 2020-07-21 PROCEDURE — 74011250636 HC RX REV CODE- 250/636: Performed by: INTERNAL MEDICINE

## 2020-07-21 PROCEDURE — 36430 TRANSFUSION BLD/BLD COMPNT: CPT

## 2020-07-21 RX ORDER — SODIUM CHLORIDE 9 MG/ML
250 INJECTION, SOLUTION INTRAVENOUS AS NEEDED
Status: DISCONTINUED | OUTPATIENT
Start: 2020-07-21 | End: 2020-07-21 | Stop reason: HOSPADM

## 2020-07-21 RX ORDER — PANTOPRAZOLE SODIUM 40 MG/1
40 TABLET, DELAYED RELEASE ORAL DAILY
Qty: 30 TAB | Refills: 0 | Status: SHIPPED | OUTPATIENT
Start: 2020-07-21 | End: 2020-08-20

## 2020-07-21 RX ADMIN — METOPROLOL TARTRATE 25 MG: 25 TABLET, FILM COATED ORAL at 10:55

## 2020-07-21 RX ADMIN — Medication 10 ML: at 05:58

## 2020-07-21 RX ADMIN — SODIUM CHLORIDE 40 MG: 9 INJECTION, SOLUTION INTRAMUSCULAR; INTRAVENOUS; SUBCUTANEOUS at 10:55

## 2020-07-21 NOTE — PROGRESS NOTES
Nutrition Education    · Verbally reviewed information with Patient  · Educated on High Fiber Diet   · Written educational materials provided. · Contact name and number provided. · Refer to Patient Education activity for more details.     Electronically signed by Shane Lynn on 7/21/2020 at 12:40 PM

## 2020-07-21 NOTE — PROGRESS NOTES
Day 2 of attempting PT evaluation. Remains with active bedrest orders. Will need updated activity orders and updated lab value to maximize patient safety and participation in functional mobility for PT evaluation.

## 2020-07-21 NOTE — PROGRESS NOTES
--Bedside and Verbal shift change report given to ABHISHEK MCNEIL (oncoming nurse) by eJn Trent (offgoing nurse). Report included the following information SBAR, Kardex, MAR and Recent Results. patient to bed discharged      0830 took patient vs. medications administered, pt tolerated with ease, will continue to monitor. Will recheck HGB levels. Spoke       1110 Pt was washed up assisted her to the bathroom for a bath patient used toilet and urinated. ---Shift reassessment, pt condition unchanged, will continue to monitor. -- Shift reassessment, pt condition unchanged, will continue to monitor. -- Bedside, Verbal and Written shift change report given to ++  (oncoming nurse) by Aston Ball (offgoing nurse). Report included the following information SBAR, Kardex, MAR and Recent Results.

## 2020-07-21 NOTE — PROGRESS NOTES
Discharge instructions provided to pt on behalf of primary nurse [facility]. Verified protonix prescription filled at the [facility] Recommended follow up appointments reviewed. Peripheral iv's and telemetry monitor were removed. Pt calling granddaughter to pick her up.

## 2020-07-21 NOTE — PROGRESS NOTES
Problem: Mobility Impaired (Adult and Pediatric)  Goal: *Acute Goals and Plan of Care (Insert Text)  Description: Physical Therapy Goals  Initiated 7/21/2020 and to be accomplished within 7 day(s)  1. Patient will move from supine to sit and sit to supine  in bed with modified independence. 2.  Patient will transfer from bed to chair and chair to bed with modified independence using the least restrictive device. 3.  Patient will perform sit to stand with modified independence. 4.  Patient will ambulate with modified independence for 150 feet with the least restrictive device. 5.  Patient will ascend/descend 5 stairs with handrail(s) with modified independence. Outcome: Progressing Towards Goal   PHYSICAL THERAPY EVALUATION    Patient: Dulce Jara (89 y.o. female)  Date: 7/21/2020  Primary Diagnosis: GI bleed [K92.2]  Procedure(s) (LRB):  ESOPHAGOGASTRODUODENOSCOPY (EGD) with bx (N/A)  COLONOSCOPY with bx and cold snare polypectomy (N/A) 1 Day Post-Op   Precautions: Fall  PLOF: Independent  ASSESSMENT :  Supervision for supine to sit d/t lines. Good sitting balance. Supervision for sit to stand. Amb 150ft with no AD supervision d/t lines. Returned to seated in chair. Good safety awareness. Educated on benefits of mobility and role of PT. Declines trial of stairs at this time. Reports no difficulties with mobility prior to admission. Reports amb in room and up in chair with nursing staff prior to PT evaluation. Educated on need for RN assistance with mobility; verbalized understanding. Call bell in reach. Patient will benefit from skilled intervention to address the above impairments.   Patient's rehabilitation potential is considered to be Good  Factors which may influence rehabilitation potential include:   []         None noted  []         Mental ability/status  [x]         Medical condition  []         Home/family situation and support systems  []         Safety awareness  []         Pain tolerance/management  []         Other:      PLAN :  Recommendations and Planned Interventions:   [x]           Bed Mobility Training             [x]    Neuromuscular Re-Education  [x]           Transfer Training                   []    Orthotic/Prosthetic Training  [x]           Gait Training                          []    Modalities  [x]           Therapeutic Exercises           []    Edema Management/Control  [x]           Therapeutic Activities            [x]    Family Training/Education  [x]           Patient Education  []           Other (comment):    Frequency/Duration: Patient will be followed by physical therapy 3-5 times a week to address goals. Discharge Recommendations: Home Health  Further Equipment Recommendations for Discharge: N/A     SUBJECTIVE:   Patient stated I get up with someone to the bathroom.     OBJECTIVE DATA SUMMARY:     Past Medical History:   Diagnosis Date    Atrial flutter ([facility] [address] 02/2015    Cardiomyopathy ([facility] [address]     EF 30% (2/15)    Hypertension     Mitral regurgitation      Past Surgical History:   Procedure Laterality Date    COLONOSCOPY N/A 7/20/2020    COLONOSCOPY with bx and cold snare polypectomy performed by Page Lawrence MD at [facility] ENDOSCOPY     Barriers to Learning/Limitations: None  Compensate with: Visual Cues, Verbal Cues, Tactile Cues and Kinesthetic Cues    Home Situation:  Home Situation  Home Environment: Private residence  # Steps to Enter: 5  Rails to Enter: Yes  Hand Rails : Left  One/Two Story Residence: Two story  # of Interior Steps: 8  Interior Rails: Left  Living Alone: Yes  Support Systems: Child(axel)  Patient Expects to be Discharged to[de-identified] Private residence  Current DME Used/Available at Home: None    Critical Behavior:  Neurologic State: Alert  Orientation Level: Oriented X4  Cognition: Follows commands     Psychosocial  Patient Behaviors: Cooperative    Strength:    Manual Muscle Testing (LE)         R     L    Hip Flexion:   4+/5  4+/5  Knee EXT:   4+/5  4+/5  Knee FLEX:   4+/5  4+/5  Ankle DF:   4+/5  4+/5  _________________________________________________   Range Of Motion:  BLE AROM WFL  Functional Mobility:  Bed Mobility:  Supine to Sit: Supervision  Transfers:  Sit to Stand: Supervision  Stand to Sit: Supervision  Balance:   Sitting: Intact  Standing: Impaired  Standing - Static: Good  Standing - Dynamic : Fair  Ambulation/Gait Training:  Distance (ft): 150 Feet (ft)   Ambulation - Level of Assistance: Supervision  Stairs:   Declines trial     Pain:  Pain level pre-treatment: 0/10   Pain level post-treatment: 0/10     Activity Tolerance:   Good    After treatment:   [x]         Patient left in no apparent distress sitting up in chair  []         Patient left in no apparent distress in bed  [x]         Call bell left within reach  [x]         Nursing notified  []         Caregiver present  []         Bed alarm activated  []         SCDs applied    COMMUNICATION/EDUCATION:   [x]         Role of physical therapy and plan of care in the acute care setting. [x]         Fall prevention education was provided and the patient/caregiver indicated understanding. [x]         Patient/family have participated as able in goal setting and plan of care. []         Patient/family agree to work toward stated goals and plan of care. []         Patient understands intent and goals of therapy, but is neutral about his/her participation. []         Patient is unable to participate in goal setting/plan of care: ongoing with therapy staff.     Thank you for this referral.  Perri Porter, PT   Time Calculation: 13 mins    Eval Complexity: History: MEDIUM  Complexity : 1-2 comorbidities / personal factors will impact the outcome/ POC Exam:MEDIUM Complexity : 3 Standardized tests and measures addressing body structure, function, activity limitation and / or participation in recreation  Presentation: MEDIUM Complexity : Evolving with changing characteristics  Clinical Decision Making:Medium Complexity Clinical judgement; ROM, MMT, functional mobility Overall Complexity:MEDIUM

## 2020-07-21 NOTE — PROGRESS NOTES
Nutrition Assessment     Type and Reason for Visit: Reassess, Patient education    Nutrition Recommendations/Plan:      1. Continue Diet: Dental Soft Diet   2. Monitor labs, weight and document all PO intake  3. Educated patient on high fiber foods to increase in diet        Nutrition Assessment:  Pt is s/p EGD and colonoscopy on (7/20); showing severe pan diverticulosis. Diet advanced to solid foods this morning. Pt seen in room this afternoon stated she is hungry and eager to eat lunch. Discussed nutrition recommendations for high fiber diet; handouts provided. Plan to D/C today    Malnutrition Assessment:  Malnutrition Status: No malnutrition     Estimated Daily Nutrient Needs:  Energy (kcal):  0881-2679  Protein (g):  61-77       Fluid (ml/day):  1 mL/kcal    Nutrition Related Findings:  No GI discomfort and last BM on (7/20). Poor dentition      Current Nutrition Therapies:  DIET DENTAL SOFT (SOFT SOLID)    Anthropometric Measures:  · Height:  5' 4\" (162.6 cm)  · Current Body Wt:  76.7 kg (169 lb 1.5 oz)(per EMR)  · BMI: 29    Nutrition Diagnosis:   · Food & nutrition-related knowledge deficit related to (diverticulosis) as evidenced by (need for high fiber education)      Nutrition Intervention:  Food and/or Nutrient Delivery: Continue current diet  Nutrition Education and Counseling: Education completed  Coordination of Nutrition Care: Continued inpatient monitoring    Goals:  PO nutrition intake will meet >75% of patient estimated nutritional needs within the next 7 days.        Nutrition Monitoring and Evaluation:   Food/Nutrient Intake Outcomes: Food and nutrient intake  Physical Signs/Symptoms Outcomes: Biochemical data, GI status, Weight    Discharge Planning:    Continue current diet     Electronically signed by Delia Webb on 7/21/2020 at 12:48 PM    Pager: [phone]

## 2020-07-21 NOTE — PROGRESS NOTES
[facility] - Progress Note  Admit Date: 7/17/2020    Assessment:     -Acute blood loss anemia, Hgb 5.8 at presentation, transfusing, worsened due to xarelto. GI following. -EGD and colonoscopy (07/20): Scarred duodenal ulcer and significant diverticulosis  -Most likely diverticular bleed  -EKG with sinus tachycardia, RBBB  -h/o paroxysmal A.fib on lopressor/xarelto  -Indeterminate troponin, not consistent with ACS  -Chronic diastolic heart failure, EF 50% by echo 7/18/20, no change from 2015  -h/o HTN  -h/o noncompliance, last seen PMD 3 years ago     Last seen by Dr. Wolf Coleman in 2015    Plan:     [facility] held, EGD and colonoscopy findings noted   According to nursing report, seems like patient may have received 3 units of blood transfusion for hemoglobin of 2.7. In my opinion that could be in an error. Defer to primary team    Will need clearance from GI team regarding anticoagulation versus antiplatelet. Would continue to hold as of now    Subjective:     No new complaints.   No chest pain or shortness of breath    Objective:      Patient Vitals for the past 8 hrs:   Temp Pulse Resp BP SpO2   07/21/20 0756 98.5 °F (36.9 °C) 76 16 149/76 100 %   07/21/20 0630 98.5 °F (36.9 °C) 74 16 136/74 100 %   07/21/20 0530 98.5 °F (36.9 °C) 77 16 171/89 98 %   07/21/20 0500 98.6 °F (37 °C) 74 16 167/71 97 %   07/21/20 0445 98.4 °F (36.9 °C) 78 16 154/73 100 %   07/21/20 0415 98.4 °F (36.9 °C) 71 16 141/61 100 %   07/21/20 0344 98.5 °F (36.9 °C) 73 16 147/70 99 %   07/21/20 0245 98.7 °F (37.1 °C) 65 16 147/69 97 %   07/21/20 0215 98.6 °F (37 °C) 71 16 136/71 96 %         Patient Vitals for the past 96 hrs:   Weight   07/20/20 1110 161 lb 4.6 oz (73.2 kg)   07/19/20 1041 161 lb 3.2 oz (73.1 kg)   07/18/20 1018 169 lb (76.7 kg)   07/18/20 0005 169 lb (76.7 kg)   07/17/20 1528 150 lb (68 kg)                    Intake/Output Summary (Last 24 hours) at 7/21/2020 1004  Last data filed at 7/21/2020 0648  Gross per 24 hour   Intake 3202.13 ml   Output    Net 3202.13 ml       Physical Exam:  General:  alert, cooperative, no distress, appears stated age  Neck:  nontender  Lungs:  clear to auscultation bilaterally  Heart:  regular rate and rhythm, S1, S2 normal, no murmur, c  Abdomen:  abdomen is soft without significant tenderness,   Extremities:  extremities normal, atraumatic, no cyanosis or edema    Data Review:     Labs: Results:       Chemistry Recent Labs     07/20/20  0400 07/19/20  0054   GLU 99 93    140   K 3.5 3.6    111   CO2 24 26   BUN 10 11   CREA 1.00 1.10   CA 8.3* 8.1*   AGAP 7 3   BUCR 10* 10*      CBC w/Diff Recent Labs     07/21/20 0750 07/20/20 2055 07/20/20  0830 07/20/20 0400  07/19/20  0054   WBC  --   --   --  5.7  --  6.2   RBC  --   --   --  2.76*  --  2.42*   HGB 11.1* 2.7* 8.2* 8.0*   < > 6.9*   HCT 33.1* 8.9* 25.0* 24.6*   < > 21.4*   PLT  --   --   --  195  --  184   GRANS  --   --   --  60  --  57   LYMPH  --   --   --  29  --  33   EOS  --   --   --  2  --  3    < > = values in this interval not displayed. Cardiac Enzymes No results found for: CPK, CK, CKMMB, CKMB, RCK3, CKMBT, CKNDX, CKND1, RAVI, TROPT, TROIQ, DENG, TROPT, TNIPOC, BNP, BNPP   Coagulation Recent Labs     07/21/20 0750 07/20/20  0400   PTP 14.6 17.2*   INR 1.2 1.4*       Lipid Panel Lab Results   Component Value Date/Time    Cholesterol, total 127 02/18/2015 08:40 PM    HDL Cholesterol 40 02/18/2015 08:40 PM    LDL, calculated 67.2 02/18/2015 08:40 PM    VLDL, calculated 19.8 02/18/2015 08:40 PM    Triglyceride 99 02/18/2015 08:40 PM    CHOL/HDL Ratio 3.2 02/18/2015 08:40 PM      BNP No results found for: BNP, BNPP, XBNPT   Liver Enzymes No results for input(s): TP, ALB, TBIL, AP in the last 72 hours.     No lab exists for component: SGOT, GPT, DBIL   Digoxin    Thyroid Studies Lab Results   Component Value Date/Time    TSH 3.39 02/18/2015 08:40 PM          Signed By: Su Tomas MD     July 21, 2020

## 2020-07-21 NOTE — DISCHARGE SUMMARY
Internal Medicine Discharge Summary        Patient: Collin Collins    YOB: 1935    Age:  80 y. o. Admit Date: 7/17/2020    Discharge Date: 7/21/2020    LOS:  LOS: 4 days     Discharge To: Home    Consults: Gastroenterology    Admission Diagnoses: GI bleed [K92.2]    Discharge Diagnoses:    Problem List as of 7/21/2020 Date Reviewed: 7/20/2020          Codes Class Noted - Resolved    * (Principal) GI bleed ICD-10-CM: K92.2  ICD-9-CM: 578.9  7/17/2020 - Present        Coagulopathy ([facility] [address] ICD-10-CM: D68.9  ICD-9-CM: 286.9  7/17/2020 - Present        Acute blood loss anemia ICD-10-CM: D62  ICD-9-CM: 285.1  7/17/2020 - Present        Cardiomyopathy ([facility] [address] ICD-10-CM: I42.9  ICD-9-CM: 425.4  2/19/2015 - Present        Mitral regurgitation ICD-10-CM: I34.0  ICD-9-CM: 424.0  2/19/2015 - Present    Overview Signed 2/19/2015  3:00 PM by Lee Nina MD     Moderate             Acute pulmonary edema ([facility] [address] ICD-10-CM: J81.0  ICD-9-CM: 518.4  2/18/2015 - Present        Tachycardia ICD-10-CM: R00.0  ICD-9-CM: 785.0  2/18/2015 - Present        HTN (hypertension) ICD-10-CM: I10  ICD-9-CM: 401.9  2/18/2015 - Present        CHF (congestive heart failure) (HCC) ICD-10-CM: I50.9  ICD-9-CM: 428.0  2/18/2015 - Present        RESOLVED: CHF (congestive heart failure) (HCC) ICD-10-CM: I50.9  ICD-9-CM: 428.0  2/18/2015 - 2/18/2015        RESOLVED: Pulmonary edema ICD-10-CM: J81.1  ICD-9-CM: 514  2/18/2015 - 2/18/2015              Discharge Condition:  Improved    Procedures: EGD, colonoscopy         HPI: Ms. Fabrizio Herring is a 80 y.o.  female who is admitted for rectal bleeding and coagulopathy. Ms. Fabrizio Herring with past medical history as noted below , presented to the Emergency Department today  complaining of above. Triage and ER notes noted. Patient presented to the emergency room complaining of dizziness and blood in the stool for the last 3 days.   She told me she has been losing blood when she goes to the bathroom since Sunday and she thought this can get better but it did not light up and as she did not feel well today she called her granddaughter who brought her to the emergency room. She denies nausea vomiting or abdominal pain. She did not eat anything today because she did not feel like it and also to avoid to go to the bathroom which goes right away or soon after she ate. She cannot remember when was the last time seen primary doctor but as of March when she was admitted to this hospital about 3 years ago and never seen a doctor since, maybe 1 or 2 times. No doctor calls medications for her but she does not take any medicine regularly and that is white/long. Patient was poor historian clearly and able to give meaningful or appropriate history apart from what mentioned. ER provider called GI doctor on call and ordered one blood transfusion for her. Her hemoglobin was 6.4 in the emergency room. She does not check her blood pressure. She is not aware of any thyroid problem or diabetes mellitus. Fecal occult blood tested positive in the ER. She does not smoke or drink alcohol or any other substances. She does not take aspirin or over-the-counter ibuprofen or similar agents. She Drive and lives by herself    Hospital Course:     #  GI bleed 2/2 Diverticular Bleed (7/17/2020).  scoping in am per GI.    Presented as rectal bleeding.  PPI IV . GI consulted. IVF. Serial H/H. Monitor lytes. Maintain good IV access. Maintain vital signs. Avoid NSAID. Hgb remained stable above 8 although improved above 11 after 3 units transfused due to error in Hgb (falsely reported as 2.3)  EGD and colonoscopy done with results and recommendations below, including RESTART XARELTO BACK UP IN 6 DAYS:    Procedure: Esophagogastroduodenoscopy with biopsy     IMPRESSION:   1. Normal esophagus  2. Small hiatal hernia  3. Normal stomach  4. Duodenal ulcer with scarring. No active bleeding. No visible vessel.    5. Normal second portion of duodenum     RECOMMENDATIONS:  1. Resume regular diet. 2. Monitor H&H.   3. Pantoprazole once daily  4. Will follow up pathology    Colonoscopy:  IMPRESSION:  1. Severe pan diverticulosis. 2. Blood in the colon. Unable to localize source. Likely diverticular in origin  3. Transverse colon polyps x 3, 8-9 mm, flat, status cold forceps and post cold snare removed  4. Descending colon polyps x 2, 7-8 mm, flat, status post cold snare removed  5. Hemorrhoids     RECOMMENDATIONS:  1. Resume regular diet, Recommend high fiber. 2. Will contact with polyp results in 2 weeks. These results will determine timing to next screening. Patient will be instructed to contact our office if they have not received the results by three weeks.       # Coagulopathy.  INR elevated.  She is on Xarelto.  No need for active reversal with plasma agents as no active bleeding.  Subcutaneous vitamin K and observe. Monitor INR.     #  History of chronic diastolic CHF (congestive heart failure) and coagulopathy ([address] (7/17/2020).  Home regimen when appropriate.  She does not follow with cardiologist.      #  Acute blood loss anemia (7/17/2020) blood transfusion according to guideline   3 units of blood transfusion so far.     #  Mild elevation trop. TTE with diastolic HF. Cardiology on board. No further inpatient workup     #   HTN (hypertension) (2/18/2015) control blood pressure.  Placed parameter to avoid hypotension especially with GI bleed       The rest of the patient's chronic conditions were managed appropriately during their admission. They were medically stable at the time of discharge.     Visit Vitals  /76 (BP 1 Location: Right arm, BP Patient Position: Sitting)   Pulse 77   Temp 98.3 °F (36.8 °C)   Resp 16   Ht 5' 4\" (1.626 m)   Wt 73.2 kg (161 lb 4.6 oz)   SpO2 100%   BMI 27.69 kg/m²       Physical Exam at Discharge:  General Appearance: NAD, conversant  HENT: normocephalic/atraumatic, moist mucus membranes  Lungs: CTA with normal respiratory effort  CV: RRR, no m/r/g  Abdomen: soft, non-tender, normal bowel sounds  Extremities: no cyanosis, no peripheral edema  Neuro: moves all extremities, no focal deficits  Psych: appropriate affect, alert and oriented to person, place and time    Labs Prior to Discharge:  Labs: Results:       Chemistry Recent Labs     07/20/20  0400 07/19/20  0054   GLU 99 93    140   K 3.5 3.6    111   CO2 24 26   BUN 10 11   CREA 1.00 1.10   CA 8.3* 8.1*   AGAP 7 3   BUCR 10* 10*      CBC w/Diff Recent Labs     07/21/20 0750 07/20/20 2055 07/20/20  0830 07/20/20  0400  07/19/20  0054   WBC  --   --   --  5.7  --  6.2   RBC  --   --   --  2.76*  --  2.42*   HGB 11.1* 2.7* 8.2* 8.0*   < > 6.9*   HCT 33.1* 8.9* 25.0* 24.6*   < > 21.4*   PLT  --   --   --  195  --  184   GRANS  --   --   --  60  --  57   LYMPH  --   --   --  29  --  33   EOS  --   --   --  2  --  3    < > = values in this interval not displayed. Cardiac Enzymes No results for input(s): CPK, CKND1, RAVI in the last 72 hours. No lab exists for component: CKRMB, TROIP   Coagulation Recent Labs     07/21/20 0750 07/20/20  0400   PTP 14.6 17.2*   INR 1.2 1.4*       Lipid Panel Lab Results   Component Value Date/Time    Cholesterol, total 127 02/18/2015 08:40 PM    HDL Cholesterol 40 02/18/2015 08:40 PM    LDL, calculated 67.2 02/18/2015 08:40 PM    VLDL, calculated 19.8 02/18/2015 08:40 PM    Triglyceride 99 02/18/2015 08:40 PM    CHOL/HDL Ratio 3.2 02/18/2015 08:40 PM      BNP No results for input(s): BNPP in the last 72 hours. Liver Enzymes No results for input(s): TP, ALB, TBIL, AP in the last 72 hours.     No lab exists for component: SGOT, GPT, DBIL   Thyroid Studies Lab Results   Component Value Date/Time    TSH 3.39 02/18/2015 08:40 PM            Significant Imaging:  Ct Head Wo Cont    Result Date: 7/17/2020  EXAM: CT HEAD, WITHOUT IV CONTRAST INDICATION: Altered level of consciousness, dizziness, anorexia COMPARISON: None TECHNIQUE: Multiple axial CT images of the head were obtained extending from the skull base through the vertex. Additional coronal and sagittal reformations were also performed. One or more dose reduction techniques were used on this CT: automated exposure control, adjustment of the mAs and/or kVp according to patient size, and iterative reconstruction techniques. The specific techniques used on this CT exam have been documented in the patient's electronic medical record. Digital Imaging and Communications in Medicine (DICOM) format image data are available to nonaffiliated external healthcare facilities or entities on a secure, media free, reciprocally searchable basis with patient authorization for at least a 12-month period after this study. _______________ FINDINGS: BRAIN AND POSTERIOR FOSSA: There is generalized prominence of the ventricular system, associated with proportional widening of the cortical cerebral sulci, compatible with generalized volume loss. Hazy hypoattenuation identified along the periventricular white matter, a nonspecific finding which is most commonly encountered in the setting of chronic microvascular disease. There is no intracranial hemorrhage, mass effect, or midline shift. There are no significant additional areas of abnormal parenchymal attenuation. EXTRA-AXIAL SPACES AND MENINGES: There are no abnormal extra-axial fluid collections. CALVARIUM: No acute osseous abnormality. OTHER: The visualized portions of the paranasal sinuses and mastoid air cells are clear. _______________     IMPRESSION: 1. No CT evidence of an acute intracranial abnormality - in particular, no acute cortical infarct, hemorrhage, or mass effect. 2.  Relatively mild cerebral atrophy/volume loss and periventricular white matter changes, most commonly seen with chronic microvascular disease.             Discharge Medications:     Current Discharge Medication List      START taking these medications    Details   pantoprazole (Protonix) 40 mg tablet Take 1 Tab by mouth daily for 30 days. Qty: 30 Tab, Refills: 0         CONTINUE these medications which have CHANGED    Details   rivaroxaban (XARELTO) 20 mg tab tablet Take 1 Tab by mouth daily (with breakfast). Qty: 30 Tab, Refills: 0         CONTINUE these medications which have NOT CHANGED    Details   metoprolol (LOPRESSOR) 25 mg tablet Take 1 Tab by mouth every twelve (12) hours. Qty: 60 Tab, Refills: 0      lisinopril (PRINIVIL, ZESTRIL) 5 mg tablet Take 1 Tab by mouth daily. Qty: 30 Tab, Refills: 0      furosemide (LASIX) 40 mg tablet Take 1 Tab by mouth daily. Qty: 30 Tab, Refills: 0      potassium chloride (KAON 10%) 20 mEq/15 mL solution Take 15 mL by mouth daily. Qty: 480 mL, Refills: 0      traMADol (ULTRAM) 50 mg tablet Take 1 Tab by mouth every six (6) hours as needed for Pain. Qty: 20 Tab, Refills: 0             Activity: PT/OT per Home Health    Diet: Resume previous diet    Wound Care: None needed    Follow-up:   Please follow up with your PCP within 7 days to discuss your recent hospitalization. Patient to arrange.   GI in 2-3 weeks for biopsy report         Total time spent including time spent on final examination and discharge discussion, discharge documentation and records reviewed and medication reconciliation: > 30 minutes    Jamar Rodriguez DO  Internal Medicine, Hospitalist  Pager: [phone]

## 2020-07-21 NOTE — PROGRESS NOTES
INTERIM UPDATE - 8733 EST on 7/20/2020    Nursing Staff reports that H&H returns at 2.7, which was previously 8.0 this AM.  Nursing Staff reports that Patient is not symptomatic at this time. Nursing Staff reports that she is headed to Blood Bank to  a unit that was on hold for the Patient; however, Patient needs a new Type & Screen. Patient was admitted for a GI Bleed. Plan:  STAT H&H and Type and Screen before Blood Transfusion. Patient will receive the initial 1 unit of PRBCs, but the repeat H&H (if it shows an error), may prevent the administration of the 2-3 units that Patient will likely need if H&H of 2.7 is accurate.

## 2020-07-21 NOTE — PROGRESS NOTES
Problem: Falls - Risk of  Goal: *Absence of Falls  Description: Document [facility] Fall Risk and appropriate interventions in the flowsheet.   Outcome: Progressing Towards Goal  Note: Fall Risk Interventions:  Mobility Interventions: Patient to call before getting OOB         Medication Interventions: Patient to call before getting OOB, Teach patient to arise slowly    Elimination Interventions: Call light in reach              Problem: Patient Education: Go to Patient Education Activity  Goal: Patient/Family Education  Outcome: Progressing Towards Goal     Problem: Patient Education: Go to Patient Education Activity  Goal: Patient/Family Education  Outcome: Progressing Towards Goal     Problem: Upper and Lower GI Bleed: Day 1  Goal: Off Pathway (Use only if patient is Off Pathway)  Outcome: Progressing Towards Goal  Goal: Activity/Safety  Outcome: Progressing Towards Goal  Goal: Consults, if ordered  Outcome: Progressing Towards Goal  Goal: Diagnostic Test/Procedures  Outcome: Progressing Towards Goal  Goal: Nutrition/Diet  Outcome: Progressing Towards Goal  Goal: Discharge Planning  Outcome: Progressing Towards Goal  Goal: Medications  Outcome: Progressing Towards Goal  Goal: Respiratory  Outcome: Progressing Towards Goal  Goal: Treatments/Interventions/Procedures  Outcome: Progressing Towards Goal  Goal: Psychosocial  Outcome: Progressing Towards Goal  Goal: *Optimal pain control at patient's stated goal  Outcome: Progressing Towards Goal  Goal: *Hemodynamically stable  Outcome: Progressing Towards Goal  Goal: *Demonstrates progressive activity  Outcome: Progressing Towards Goal     Problem: Upper and Lower GI Bleed: Day 2  Goal: Off Pathway (Use only if patient is Off Pathway)  Outcome: Progressing Towards Goal  Goal: Activity/Safety  Outcome: Progressing Towards Goal  Goal: Consults, if ordered  Outcome: Progressing Towards Goal  Goal: Diagnostic Test/Procedures  Outcome: Progressing Towards Goal  Goal: Nutrition/Diet  Outcome: Progressing Towards Goal  Goal: Discharge Planning  Outcome: Progressing Towards Goal  Goal: Medications  Outcome: Progressing Towards Goal  Goal: Respiratory  Outcome: Progressing Towards Goal  Goal: Treatments/Interventions/Procedures  Outcome: Progressing Towards Goal  Goal: Psychosocial  Outcome: Progressing Towards Goal  Goal: *Optimal pain control at patient's stated goal  Outcome: Progressing Towards Goal  Goal: *Hemodynamically stable  Outcome: Progressing Towards Goal  Goal: *Tolerating diet  Outcome: Progressing Towards Goal  Goal: *Demonstrates progressive activity  Outcome: Progressing Towards Goal     Problem: Upper and Lower GI Bleed: Day 3  Goal: Off Pathway (Use only if patient is Off Pathway)  Outcome: Progressing Towards Goal  Goal: Activity/Safety  Outcome: Progressing Towards Goal  Goal: Diagnostic Test/Procedures  Outcome: Progressing Towards Goal  Goal: Nutrition/Diet  Outcome: Progressing Towards Goal  Goal: Discharge Planning  Outcome: Progressing Towards Goal  Goal: Medications  Outcome: Progressing Towards Goal  Goal: Treatments/Interventions/Procedures  Outcome: Progressing Towards Goal  Goal: Psychosocial  Outcome: Progressing Towards Goal     Problem: Upper and Lower GI Bleed:  Discharge Outcomes  Goal: *Hemodynamically stable  Outcome: Progressing Towards Goal  Goal: *Lungs clear or at baseline  Outcome: Progressing Towards Goal  Goal: *Demonstrates independent activity or return to baseline  Outcome: Progressing Towards Goal  Goal: *Pain is controlled to three or less  Outcome: Progressing Towards Goal  Goal: *Verbalizes understanding and describes prescribed diet  Outcome: Progressing Towards Goal  Goal: *Tolerating diet  Outcome: Progressing Towards Goal  Goal: *Verbalizes name, dosage, time, side effects, and number of days to continue medications  Outcome: Progressing Towards Goal  Goal: *Anxiety reduced or absent  Outcome: Progressing Towards Goal  Goal: *Understands and describes signs and symptoms to report to providers(Stroke Metric)  Outcome: Progressing Towards Goal  Goal: *Describes follow-up/return visits to physicians  Outcome: Progressing Towards Goal  Goal: *Describes available resources and support systems  Outcome: Progressing Towards Goal     Problem: Patient Education: Go to Patient Education Activity  Goal: Patient/Family Education  Outcome: Progressing Towards Goal     Problem: Pain  Goal: *Control of Pain  Outcome: Progressing Towards Goal     Problem: Nutrition Deficit  Goal: *Optimize nutritional status  Outcome: Progressing Towards Goal     Problem: Patient Education: Go to Patient Education Activity  Goal: Patient/Family Education  Outcome: Progressing Towards Goal

## 2020-07-21 NOTE — DISCHARGE INSTRUCTIONS
DISCHARGE SUMMARY from Nurse    PATIENT INSTRUCTIONS:    After general anesthesia or intravenous sedation, for 24 hours or while taking prescription Narcotics:  · Limit your activities  · Do not drive and operate hazardous machinery  · Do not make important personal or business decisions  · Do  not drink alcoholic beverages  · If you have not urinated within 8 hours after discharge, please contact your surgeon on call. Report the following to your surgeon:  · Excessive pain, swelling, redness or odor of or around the surgical area  · Temperature over 100.5  · Nausea and vomiting lasting longer than 4 hours or if unable to take medications  · Any signs of decreased circulation or nerve impairment to extremity: change in color, persistent  numbness, tingling, coldness or increase pain  · Any questions    What to do at Home:  Recommended activity: Activity as tolerated    If you experience any of the following symptoms bright red blood in stool, fever, or chills, please follow up with primary care physician/GI doctor/emergency room. *  Please give a list of your current medications to your Primary Care Provider. *  Please update this list whenever your medications are discontinued, doses are      changed, or new medications (including over-the-counter products) are added. *  Please carry medication information at all times in case of emergency situations. These are general instructions for a healthy lifestyle:    No smoking/ No tobacco products/ Avoid exposure to second hand smoke  Surgeon General's Warning:  Quitting smoking now greatly reduces serious risk to your health.     Obesity, smoking, and sedentary lifestyle greatly increases your risk for illness    A healthy diet, regular physical exercise & weight monitoring are important for maintaining a healthy lifestyle    You may be retaining fluid if you have a history of heart failure or if you experience any of the following symptoms:  Weight gain of 3 pounds or more overnight or 5 pounds in a week, increased swelling in our hands or feet or shortness of breath while lying flat in bed. Please call your doctor as soon as you notice any of these symptoms; do not wait until your next office visit. The discharge information has been reviewed with the patient. The patient verbalized understanding. Discharge medications reviewed with the patient and appropriate educational materials and side effects teaching were provided. Patient armband removed and shredded.

## 2020-07-21 NOTE — PROGRESS NOTES
Discharge/Transition Planning  Problem: Discharge Planning  Goal: *Discharge to safe environment  Outcome: Resolved/Met       Home    2nd Medicare letter digitally signed in room and copy given. Granddaughter will be picking pt up    Care Management Interventions  PCP Verified by CM: Yes(Dr. Lyla Rincon, last seen >3yrs ago)  Palliative Care Criteria Met (RRAT>21 & CHF Dx)?: No  Mode of Transport at Discharge:  Other (see comment)(family)  Transition of Care Consult (CM Consult): Discharge Planning  Discharge Durable Medical Equipment: No  Physical Therapy Consult: No  Occupational Therapy Consult: No  Speech Therapy Consult: No  Current Support Network: Lives Alone  Confirm Follow Up Transport: Self  Discharge Location  Discharge Placement: Home 10

## 2020-07-22 LAB
ABO + RH BLD: NORMAL
BLD PROD TYP BPU: NORMAL
BLD PROD TYP BPU: NORMAL
BLOOD GROUP ANTIBODIES SERPL: NORMAL
BPU ID: NORMAL
BPU ID: NORMAL
CROSSMATCH RESULT,%XM: NORMAL
CROSSMATCH RESULT,%XM: NORMAL
SPECIMEN EXP DATE BLD: NORMAL
STATUS OF UNIT,%ST: NORMAL
STATUS OF UNIT,%ST: NORMAL
UNIT DIVISION, %UDIV: 0
UNIT DIVISION, %UDIV: 0

## 2020-08-12 ENCOUNTER — OFFICE VISIT (OUTPATIENT)
Dept: CARDIOLOGY CLINIC | Age: 85
End: 2020-08-12

## 2020-08-12 VITALS
HEART RATE: 51 BPM | BODY MASS INDEX: 27.29 KG/M2 | WEIGHT: 159 LBS | OXYGEN SATURATION: 97 % | TEMPERATURE: 98.2 F | DIASTOLIC BLOOD PRESSURE: 70 MMHG | RESPIRATION RATE: 18 BRPM | SYSTOLIC BLOOD PRESSURE: 149 MMHG

## 2020-08-12 DIAGNOSIS — I42.0 DILATED CARDIOMYOPATHY (HCC): Primary | ICD-10-CM

## 2020-08-12 DIAGNOSIS — I10 ESSENTIAL HYPERTENSION: ICD-10-CM

## 2020-08-12 DIAGNOSIS — I48.0 PAF (PAROXYSMAL ATRIAL FIBRILLATION) (HCC): ICD-10-CM

## 2020-08-12 NOTE — PROGRESS NOTES
[facility]    As you know, Ms. Hilario Bolden is a 80 y.o. female with cardiomyopathy, hypertension, atrial flutter. Ms. Hilario Bolden is here today for a follow up appointment. She was recently admitted to the hospital with acute GI bleed from diverticulosis. Her Xarelto was stopped for 7 days and she was asked to start Xarelto. She is awaiting her Xarelto to coming mail in next day or 2. She denies any chest pain or chest tightness she denies any presyncope or syncope  Denies any nausea, vomiting, abdominal pain, fever, chills, sputum production. No hematuria or other bleeding complaints    Past Medical History:   Diagnosis Date    Atrial flutter ([facility] [address] 02/2015    Cardiomyopathy ([facility] [address]     EF 30% (2/15)    Hypertension     Mitral regurgitation        CARDIAC RISK FACTORS:  HTN, Age     Past Surgical History:   Procedure Laterality Date    COLONOSCOPY N/A 7/20/2020    COLONOSCOPY with bx and cold snare polypectomy performed by Michelle Hernandez MD at [facility] ENDOSCOPY       Current Outpatient Medications   Medication Sig    pantoprazole (Protonix) 40 mg tablet Take 1 Tab by mouth daily for 30 days.  metoprolol (LOPRESSOR) 25 mg tablet Take 1 Tab by mouth every twelve (12) hours.  lisinopril (PRINIVIL, ZESTRIL) 5 mg tablet Take 1 Tab by mouth daily.  furosemide (LASIX) 40 mg tablet Take 1 Tab by mouth daily.  potassium chloride (KAON 10%) 20 mEq/15 mL solution Take 15 mL by mouth daily.  rivaroxaban (XARELTO) 20 mg tab tablet Take 1 Tab by mouth daily (with breakfast).  traMADol (ULTRAM) 50 mg tablet Take 1 Tab by mouth every six (6) hours as needed for Pain. No current facility-administered medications for this visit. Allergies and Sensitivities:  No Known Allergies    Family History:  No family history on file.     Social History:  Social History     Tobacco Use    Smoking status: Never Smoker    Smokeless tobacco: Never Used   Substance Use Topics    Alcohol use: No    Drug use: No     She  reports that she has never smoked. She has never used smokeless tobacco.  She  reports no history of alcohol use. Review of Systems:  Cardiac symptoms as noted above in HPI. All others negative. Denies fatigue, malaise, skin rash, joint pain, blurring vision, photophobia, neck pain, hemoptysis, chronic cough, nausea, vomiting, hematuria, burning micturition, BRBPR, chronic headaches. Physical Exam:  BP Readings from Last 3 Encounters:   08/12/20 149/70   07/21/20 147/76   06/18/15 124/65         Pulse Readings from Last 3 Encounters:   08/12/20 (!) 51   07/21/20 77   06/18/15 63          Wt Readings from Last 3 Encounters:   08/12/20 159 lb (72.1 kg)   07/20/20 161 lb 4.6 oz (73.2 kg)   06/18/15 163 lb (73.9 kg)       Constitutional: Oriented to person, place, and time. HENT: Head: Normocephalic and atraumatic. Neck: No JVD present. Carotid bruit is not appreciated. Cardiovascular: Regular rhythm. No murmur, gallop or rubs appreciated  Lung: Breath sounds normal. No respiratory distress. No ronchi or rales appreciated  Abdominal: No tenderness. No rebound and no guarding. Musculoskeletal: There is no lower extremity edema. No cynosis    Review of Data  LABS:   Lab Results   Component Value Date/Time    Sodium 142 07/20/2020 04:00 AM    Potassium 3.5 07/20/2020 04:00 AM    Chloride 111 07/20/2020 04:00 AM    CO2 24 07/20/2020 04:00 AM    Glucose 99 07/20/2020 04:00 AM    BUN 10 07/20/2020 04:00 AM    Creatinine 1.00 07/20/2020 04:00 AM     No flowsheet data found. No components found for: GPT,  ALT  No results found for: HBA1C, [id number]    EKG  (02/2015) Tele evidence of A.flutter  (06/15) Sinus rhythm at 57 bpm. RBBB    ECHO (07/20)  Left Ventricle  Normal wall thickness. Dilated left ventricle. Low normal systolic function. The estimated ejection fraction is 50 - 55%. Visually measured ejection fraction.  LV wall motion is noted to be no regional wall motion abnormality. There is mild (grade 1) left ventricular diastolic dysfunction. Wall Scoring  The left ventricular wall motion is normal.             Left Atrium  Severely dilated left atrium. Left Atrium volume index is 61.16 mL/m2. Right Ventricle  Normal cavity size and global systolic function. Right Atrium  Normal cavity size. Aortic Valve  Trileaflet valve structure, no stenosis and no regurgitation. Mild aortic valve sclerosis with no evidence of reduced excursion. Mitral Valve  No stenosis. Mitral valve non-specific thickening. Mild regurgitation. The mitral regurgitant jet is eccentric. Tricuspid Valve  Normal valve structure and no stenosis. Mild to moderate regurgitation. Pulmonic Valve  Normal valve structure and no stenosis. Mild regurgitation. Aorta  Normal aortic root and ascending aorta. Pulmonary Artery  Pulmonary arterial systolic pressure (PASP) is 45 mmHg. Pulmonary hypertension found to be mild. CATHETERIZATION (02/15)  1. LM: Angiographically normal.  2. LAD: Angiographically normal. Two diagonal branches angiographically normal.  3. Ramus/high diagonal: Angiographically normal.  4. LCX/OM: Angiographically normal.  5. RCA: Dominant, 10% to 20% proximal luminal irregularities, otherwise normal.  6. Severe cardiomyopathy with ejection fraction 25% with diffuse wall motion abnormality. Moderate mitral regurgitation, 2+.  7. PA pressure of 29/9/18 mmHg, pulmonary capillary wedge pressure of 10 mmHg. 8. No evidence of intracardiac shunting. 9. No evidence of pulmonary hypertension. IMPRESSION & PLAN:  Ms. Shanel Knowles is a 80 y.o. female with cardiomyopathy, atrial flutter and hypertension. Cardiomyopathy:    This is nonischemic, tachycardia induced cardiomyopathy. She is on appropriate medication. LVEF 50-55% in July 2020. She has no evidence of fluid overload.   Continue with metoprolol and lisinopril    Atrial flutter / a.fib: Appears to be in sinus rhythm on exam today. She is on beta-blocker. She is on Xarelto. She had episode of likely diverticular GI bleed in July 2020. She was asked to start back on Xarelto after 7 days hold. She is expecting her Xarelto to be delivered to her in the next day or 2. Hypertension:  Blood pressure is stable continue same    Importance of diet and exercise was discussed with patient. This plan was discussed with patient who is in agreement. Thank you for allowing me to participate in patient care. Please feel free to call me if you have any question or concern.

## 2020-08-12 NOTE — PROGRESS NOTES
Viviana Sorto presents today for   Chief Complaint   Patient presents with   [address] [facility] preferred language for health care discussion is english/other. Is someone accompanying this pt? y    Is the patient using any DME equipment during OV? n    Depression Screening:  No flowsheet data found. Learning Assessment:  No flowsheet data found. Abuse Screening:  No flowsheet data found. Fall Risk  No flowsheet data found. Pt currently taking Anticoagulant therapy? n    Coordination of Care:  1. Have you been to the ER, urgent care clinic since your last visit? Hospitalized since your last visit? y    2. Have you seen or consulted any other health care providers outside of the [address] [place] [address] since your last visit? Include any pap smears or colon screening.  n

## (undated) DEVICE — DEVICE INFL 60ML 12ATM CONVENIENT LOK REL HNDL HI PRSS FLX

## (undated) DEVICE — KENDALL 500 SERIES DIAPHORETIC FOAM MONITORING ELECTRODE - TEAR DROP SHAPE ( 30/PK): Brand: KENDALL

## (undated) DEVICE — KIT COLON W/ 1.1OZ LUB AND 2 END

## (undated) DEVICE — BITE BLK ENDOSCP AD 54FR GRN POLYETH ENDOSCP W STRP SLD

## (undated) DEVICE — TUBING, SUCTION, 3/16" X 6', STRAIGHT: Brand: MEDLINE

## (undated) DEVICE — BASIN EMESIS 500CC ROSE 250/CS 60/PLT: Brand: MEDEGEN MEDICAL PRODUCTS, LLC

## (undated) DEVICE — FLEX ADVANTAGE 1500CC: Brand: FLEX ADVANTAGE

## (undated) DEVICE — BLOCK BITE BLOX W DENTAL RIM --

## (undated) DEVICE — MEDI-VAC NON-CONDUCTIVE SUCTION TUBING: Brand: CARDINAL HEALTH

## (undated) DEVICE — Device: Brand: DEFENDO VALVE AND CONNECTOR KIT

## (undated) DEVICE — FORCEPS BX L240CM JAW DIA2.8MM L CAP W/ NDL MIC MESH TOOTH

## (undated) DEVICE — TRAP SPEC COLL POLYP POLYSTYR --

## (undated) DEVICE — CONTAINER PREFIL FRMLN 15ML --

## (undated) DEVICE — SYR 50ML SLIP TIP NSAF LF STRL --

## (undated) DEVICE — SNARE ENDO 2.4MMX230CM -- COLD EXACTO